# Patient Record
Sex: MALE | Race: BLACK OR AFRICAN AMERICAN | Employment: UNEMPLOYED | URBAN - METROPOLITAN AREA
[De-identification: names, ages, dates, MRNs, and addresses within clinical notes are randomized per-mention and may not be internally consistent; named-entity substitution may affect disease eponyms.]

---

## 2022-10-31 ENCOUNTER — APPOINTMENT (OUTPATIENT)
Dept: GENERAL RADIOLOGY | Age: 62
DRG: 638 | End: 2022-10-31

## 2022-10-31 ENCOUNTER — HOSPITAL ENCOUNTER (INPATIENT)
Age: 62
LOS: 2 days | Discharge: HOME | DRG: 638 | End: 2022-11-02
Attending: EMERGENCY MEDICINE | Admitting: STUDENT IN AN ORGANIZED HEALTH CARE EDUCATION/TRAINING PROGRAM

## 2022-10-31 DIAGNOSIS — E11.10 DIABETIC KETOACIDOSIS WITHOUT COMA ASSOCIATED WITH TYPE 2 DIABETES MELLITUS (HCC): Primary | ICD-10-CM

## 2022-10-31 DIAGNOSIS — E11.9 NEW ONSET TYPE 2 DIABETES MELLITUS (HCC): ICD-10-CM

## 2022-10-31 LAB
A/G RATIO: 1.2 (ref 1.1–2.2)
ALBUMIN SERPL-MCNC: 4.3 G/DL (ref 3.4–5)
ALP BLD-CCNC: 146 U/L (ref 40–129)
ALT SERPL-CCNC: 15 U/L (ref 10–40)
ANION GAP SERPL CALCULATED.3IONS-SCNC: 18 MMOL/L (ref 3–16)
ANION GAP SERPL CALCULATED.3IONS-SCNC: 32 MMOL/L (ref 3–16)
AST SERPL-CCNC: 8 U/L (ref 15–37)
BASE EXCESS VENOUS: -4.6 MMOL/L
BASOPHILS ABSOLUTE: 0 K/UL (ref 0–0.2)
BASOPHILS RELATIVE PERCENT: 0.5 %
BETA-HYDROXYBUTYRATE: >8 MMOL/L (ref 0–0.27)
BILIRUB SERPL-MCNC: 0.5 MG/DL (ref 0–1)
BILIRUBIN URINE: NEGATIVE
BLOOD, URINE: NEGATIVE
BUN BLDV-MCNC: 26 MG/DL (ref 7–20)
BUN BLDV-MCNC: 33 MG/DL (ref 7–20)
CALCIUM SERPL-MCNC: 10.2 MG/DL (ref 8.3–10.6)
CALCIUM SERPL-MCNC: 9.4 MG/DL (ref 8.3–10.6)
CARBOXYHEMOGLOBIN: 1.4 %
CHLORIDE BLD-SCNC: 79 MMOL/L (ref 99–110)
CHLORIDE BLD-SCNC: 95 MMOL/L (ref 99–110)
CLARITY: CLEAR
CO2: 15 MMOL/L (ref 21–32)
CO2: 25 MMOL/L (ref 21–32)
COLOR: YELLOW
CREAT SERPL-MCNC: 1.4 MG/DL (ref 0.8–1.3)
CREAT SERPL-MCNC: 1.7 MG/DL (ref 0.8–1.3)
EKG ATRIAL RATE: 76 BPM
EKG DIAGNOSIS: NORMAL
EKG P AXIS: 54 DEGREES
EKG P-R INTERVAL: 148 MS
EKG Q-T INTERVAL: 388 MS
EKG QRS DURATION: 84 MS
EKG QTC CALCULATION (BAZETT): 436 MS
EKG R AXIS: -1 DEGREES
EKG T AXIS: 17 DEGREES
EKG VENTRICULAR RATE: 76 BPM
EOSINOPHILS ABSOLUTE: 0 K/UL (ref 0–0.6)
EOSINOPHILS RELATIVE PERCENT: 0 %
GFR SERPL CREATININE-BSD FRML MDRD: 44 ML/MIN/{1.73_M2}
GFR SERPL CREATININE-BSD FRML MDRD: 56 ML/MIN/{1.73_M2}
GLUCOSE BLD-MCNC: 201 MG/DL (ref 70–99)
GLUCOSE BLD-MCNC: 224 MG/DL (ref 70–99)
GLUCOSE BLD-MCNC: 224 MG/DL (ref 70–99)
GLUCOSE BLD-MCNC: 254 MG/DL (ref 70–99)
GLUCOSE BLD-MCNC: 256 MG/DL (ref 70–99)
GLUCOSE BLD-MCNC: 328 MG/DL (ref 70–99)
GLUCOSE BLD-MCNC: 381 MG/DL (ref 70–99)
GLUCOSE BLD-MCNC: 395 MG/DL (ref 70–99)
GLUCOSE BLD-MCNC: 406 MG/DL (ref 70–99)
GLUCOSE BLD-MCNC: 465 MG/DL (ref 70–99)
GLUCOSE BLD-MCNC: 787 MG/DL (ref 70–99)
GLUCOSE BLD-MCNC: >600 MG/DL (ref 70–99)
GLUCOSE BLD-MCNC: >600 MG/DL (ref 70–99)
GLUCOSE URINE: 100 MG/DL
HCO3 VENOUS: 24 MMOL/L (ref 23–29)
HCT VFR BLD CALC: 53.7 % (ref 40.5–52.5)
HEMOGLOBIN: 18.1 G/DL (ref 13.5–17.5)
KETONES, URINE: 40 MG/DL
LEUKOCYTE ESTERASE, URINE: NEGATIVE
LYMPHOCYTES ABSOLUTE: 1.4 K/UL (ref 1–5.1)
LYMPHOCYTES RELATIVE PERCENT: 18.6 %
MAGNESIUM: 2.7 MG/DL (ref 1.8–2.4)
MCH RBC QN AUTO: 29.4 PG (ref 26–34)
MCHC RBC AUTO-ENTMCNC: 33.7 G/DL (ref 31–36)
MCV RBC AUTO: 87.2 FL (ref 80–100)
METHEMOGLOBIN VENOUS: 0.6 %
MICROSCOPIC EXAMINATION: ABNORMAL
MONOCYTES ABSOLUTE: 0.6 K/UL (ref 0–1.3)
MONOCYTES RELATIVE PERCENT: 7.2 %
NEUTROPHILS ABSOLUTE: 5.7 K/UL (ref 1.7–7.7)
NEUTROPHILS RELATIVE PERCENT: 73.7 %
NITRITE, URINE: NEGATIVE
O2 SAT, VEN: 29 %
O2 THERAPY: ABNORMAL
PCO2, VEN: 56.5 MMHG (ref 40–50)
PDW BLD-RTO: 14.4 % (ref 12.4–15.4)
PERFORMED ON: ABNORMAL
PH UA: 5 (ref 5–8)
PH VENOUS: 7.24 (ref 7.35–7.45)
PHOSPHORUS: 3.6 MG/DL (ref 2.5–4.9)
PLATELET # BLD: 147 K/UL (ref 135–450)
PLATELET SLIDE REVIEW: ABNORMAL
PMV BLD AUTO: 10.9 FL (ref 5–10.5)
PO2, VEN: <30 MMHG
POTASSIUM SERPL-SCNC: 4.1 MMOL/L (ref 3.5–5.1)
POTASSIUM SERPL-SCNC: 5.1 MMOL/L (ref 3.5–5.1)
PROTEIN UA: NEGATIVE MG/DL
RBC # BLD: 6.16 M/UL (ref 4.2–5.9)
RBC # BLD: NORMAL 10*6/UL
REASON FOR REJECTION: NORMAL
REJECTED TEST: NORMAL
SODIUM BLD-SCNC: 126 MMOL/L (ref 136–145)
SODIUM BLD-SCNC: 138 MMOL/L (ref 136–145)
SPECIFIC GRAVITY UA: 1.03 (ref 1–1.03)
TCO2 CALC VENOUS: 26 MMOL/L
TOTAL PROTEIN: 7.9 G/DL (ref 6.4–8.2)
TROPONIN: <0.01 NG/ML
URINE REFLEX TO CULTURE: ABNORMAL
URINE TYPE: ABNORMAL
UROBILINOGEN, URINE: 0.2 E.U./DL
WBC # BLD: 7.7 K/UL (ref 4–11)

## 2022-10-31 PROCEDURE — 81003 URINALYSIS AUTO W/O SCOPE: CPT

## 2022-10-31 PROCEDURE — 83036 HEMOGLOBIN GLYCOSYLATED A1C: CPT

## 2022-10-31 PROCEDURE — 94760 N-INVAS EAR/PLS OXIMETRY 1: CPT

## 2022-10-31 PROCEDURE — 36415 COLL VENOUS BLD VENIPUNCTURE: CPT

## 2022-10-31 PROCEDURE — 6370000000 HC RX 637 (ALT 250 FOR IP): Performed by: PHYSICIAN ASSISTANT

## 2022-10-31 PROCEDURE — 83735 ASSAY OF MAGNESIUM: CPT

## 2022-10-31 PROCEDURE — 80053 COMPREHEN METABOLIC PANEL: CPT

## 2022-10-31 PROCEDURE — 84484 ASSAY OF TROPONIN QUANT: CPT

## 2022-10-31 PROCEDURE — 6360000002 HC RX W HCPCS: Performed by: STUDENT IN AN ORGANIZED HEALTH CARE EDUCATION/TRAINING PROGRAM

## 2022-10-31 PROCEDURE — 2580000003 HC RX 258: Performed by: STUDENT IN AN ORGANIZED HEALTH CARE EDUCATION/TRAINING PROGRAM

## 2022-10-31 PROCEDURE — 93005 ELECTROCARDIOGRAM TRACING: CPT | Performed by: PHYSICIAN ASSISTANT

## 2022-10-31 PROCEDURE — 71046 X-RAY EXAM CHEST 2 VIEWS: CPT

## 2022-10-31 PROCEDURE — 84100 ASSAY OF PHOSPHORUS: CPT

## 2022-10-31 PROCEDURE — 82803 BLOOD GASES ANY COMBINATION: CPT

## 2022-10-31 PROCEDURE — 85025 COMPLETE CBC W/AUTO DIFF WBC: CPT

## 2022-10-31 PROCEDURE — 2580000003 HC RX 258: Performed by: PHYSICIAN ASSISTANT

## 2022-10-31 PROCEDURE — 82010 KETONE BODYS QUAN: CPT

## 2022-10-31 PROCEDURE — 99285 EMERGENCY DEPT VISIT HI MDM: CPT

## 2022-10-31 PROCEDURE — 2000000000 HC ICU R&B

## 2022-10-31 RX ORDER — DEXTROSE MONOHYDRATE 100 MG/ML
INJECTION, SOLUTION INTRAVENOUS CONTINUOUS PRN
Status: DISCONTINUED | OUTPATIENT
Start: 2022-10-31 | End: 2022-11-02 | Stop reason: HOSPADM

## 2022-10-31 RX ORDER — ACETAMINOPHEN 650 MG/1
650 SUPPOSITORY RECTAL EVERY 6 HOURS PRN
Status: DISCONTINUED | OUTPATIENT
Start: 2022-10-31 | End: 2022-11-02 | Stop reason: HOSPADM

## 2022-10-31 RX ORDER — SODIUM CHLORIDE 0.9 % (FLUSH) 0.9 %
5-40 SYRINGE (ML) INJECTION PRN
Status: DISCONTINUED | OUTPATIENT
Start: 2022-10-31 | End: 2022-11-02 | Stop reason: HOSPADM

## 2022-10-31 RX ORDER — ACETAMINOPHEN 325 MG/1
650 TABLET ORAL EVERY 6 HOURS PRN
Status: DISCONTINUED | OUTPATIENT
Start: 2022-10-31 | End: 2022-11-02 | Stop reason: HOSPADM

## 2022-10-31 RX ORDER — 0.9 % SODIUM CHLORIDE 0.9 %
1000 INTRAVENOUS SOLUTION INTRAVENOUS ONCE
Status: COMPLETED | OUTPATIENT
Start: 2022-10-31 | End: 2022-10-31

## 2022-10-31 RX ORDER — DEXTROSE AND SODIUM CHLORIDE 5; .45 G/100ML; G/100ML
INJECTION, SOLUTION INTRAVENOUS CONTINUOUS PRN
Status: DISCONTINUED | OUTPATIENT
Start: 2022-10-31 | End: 2022-11-01

## 2022-10-31 RX ORDER — MAGNESIUM SULFATE 1 G/100ML
1000 INJECTION INTRAVENOUS PRN
Status: DISCONTINUED | OUTPATIENT
Start: 2022-10-31 | End: 2022-11-02 | Stop reason: HOSPADM

## 2022-10-31 RX ORDER — ONDANSETRON 4 MG/1
4 TABLET, ORALLY DISINTEGRATING ORAL EVERY 8 HOURS PRN
Status: DISCONTINUED | OUTPATIENT
Start: 2022-10-31 | End: 2022-11-02 | Stop reason: HOSPADM

## 2022-10-31 RX ORDER — SODIUM CHLORIDE 0.9 % (FLUSH) 0.9 %
5-40 SYRINGE (ML) INJECTION EVERY 12 HOURS SCHEDULED
Status: DISCONTINUED | OUTPATIENT
Start: 2022-10-31 | End: 2022-11-02 | Stop reason: HOSPADM

## 2022-10-31 RX ORDER — ENOXAPARIN SODIUM 100 MG/ML
40 INJECTION SUBCUTANEOUS DAILY
Status: DISCONTINUED | OUTPATIENT
Start: 2022-11-01 | End: 2022-11-02 | Stop reason: HOSPADM

## 2022-10-31 RX ORDER — SODIUM CHLORIDE 9 MG/ML
INJECTION, SOLUTION INTRAVENOUS CONTINUOUS
Status: DISCONTINUED | OUTPATIENT
Start: 2022-10-31 | End: 2022-11-01

## 2022-10-31 RX ORDER — SODIUM CHLORIDE 9 MG/ML
INJECTION, SOLUTION INTRAVENOUS PRN
Status: DISCONTINUED | OUTPATIENT
Start: 2022-10-31 | End: 2022-11-02 | Stop reason: HOSPADM

## 2022-10-31 RX ORDER — 0.9 % SODIUM CHLORIDE 0.9 %
15 INTRAVENOUS SOLUTION INTRAVENOUS ONCE
Status: DISCONTINUED | OUTPATIENT
Start: 2022-10-31 | End: 2022-11-01

## 2022-10-31 RX ORDER — POLYETHYLENE GLYCOL 3350 17 G/17G
17 POWDER, FOR SOLUTION ORAL DAILY PRN
Status: DISCONTINUED | OUTPATIENT
Start: 2022-10-31 | End: 2022-11-02 | Stop reason: HOSPADM

## 2022-10-31 RX ORDER — ONDANSETRON 2 MG/ML
4 INJECTION INTRAMUSCULAR; INTRAVENOUS EVERY 6 HOURS PRN
Status: DISCONTINUED | OUTPATIENT
Start: 2022-10-31 | End: 2022-11-02 | Stop reason: HOSPADM

## 2022-10-31 RX ORDER — POTASSIUM CHLORIDE 7.45 MG/ML
10 INJECTION INTRAVENOUS PRN
Status: DISCONTINUED | OUTPATIENT
Start: 2022-10-31 | End: 2022-11-01

## 2022-10-31 RX ADMIN — SODIUM CHLORIDE: 9 INJECTION, SOLUTION INTRAVENOUS at 16:45

## 2022-10-31 RX ADMIN — DEXTROSE AND SODIUM CHLORIDE: 5; 450 INJECTION, SOLUTION INTRAVENOUS at 20:34

## 2022-10-31 RX ADMIN — POTASSIUM CHLORIDE 10 MEQ: 7.46 INJECTION, SOLUTION INTRAVENOUS at 18:32

## 2022-10-31 RX ADMIN — POTASSIUM CHLORIDE 10 MEQ: 7.46 INJECTION, SOLUTION INTRAVENOUS at 20:37

## 2022-10-31 RX ADMIN — POTASSIUM CHLORIDE 10 MEQ: 7.46 INJECTION, SOLUTION INTRAVENOUS at 19:34

## 2022-10-31 RX ADMIN — SODIUM CHLORIDE 1000 ML: 9 INJECTION, SOLUTION INTRAVENOUS at 12:27

## 2022-10-31 RX ADMIN — SODIUM CHLORIDE 1000 ML: 9 INJECTION, SOLUTION INTRAVENOUS at 12:37

## 2022-10-31 RX ADMIN — INSULIN HUMAN 9 UNITS/HR: 1 INJECTION, SOLUTION INTRAVENOUS at 14:38

## 2022-10-31 RX ADMIN — SODIUM CHLORIDE, PRESERVATIVE FREE 10 ML: 5 INJECTION INTRAVENOUS at 20:37

## 2022-10-31 ASSESSMENT — PAIN - FUNCTIONAL ASSESSMENT
PAIN_FUNCTIONAL_ASSESSMENT: NONE - DENIES PAIN
PAIN_FUNCTIONAL_ASSESSMENT: 0-10

## 2022-10-31 ASSESSMENT — ENCOUNTER SYMPTOMS
NAUSEA: 1
COLOR CHANGE: 0
SHORTNESS OF BREATH: 0
ABDOMINAL PAIN: 0
VOMITING: 0

## 2022-10-31 ASSESSMENT — PAIN SCALES - GENERAL: PAINLEVEL_OUTOF10: 0

## 2022-10-31 NOTE — H&P
Hospital Medicine History & Physical      PCP: No primary care provider on file. Date of Admission: 10/31/2022    Date of Service: Pt seen/examined on 10/31/22   and Admitted to Inpatient. Chief Complaint: Generalized weakness nausea and vomiting      History Of Present Illness: The patient is a 57 y.o. male who presents to [facility] with generalized weakness nausea and vomiting. Patient with no significant past medical history, not on any medications, stated that for the past 2 to 3 weeks he had been feeling progressively more weak, patient also noted excessive urination and thirst, in the last week patient also reported new onset nausea and vomiting. Patient stated last week he had a short-lived likely viral upper respiratory infection with low-grade fever and cough that had completely resolved. Patient smokes 1 pack for the past 30 years, no significant alcohol intake. Works as a     Past Medical History:        Diagnosis Date    Hypertension        Past Surgical History:    History reviewed. No pertinent surgical history. Medications Prior to Admission:    Prior to Admission medications    Not on File       Allergies:  Patient has no known allergies. Social History:  The patient currently lives independent    TOBACCO:   reports that he has been smoking cigarettes. He has never used smokeless tobacco.  ETOH:   reports that he does not currently use alcohol. Family History:  Reviewed in detail and negative for DM, Early CAD, Cancer, CVA. Positive as follows:    History reviewed. No pertinent family history. REVIEW OF SYSTEMS:   Positive for nausea, vomiting, fatigue, thirst, excessive urination and as noted in the HPI. All other systems reviewed and negative.     PHYSICAL EXAM:    BP (!) 148/96   Pulse 77   Temp 96.9 °F (36.1 °C) (Temporal)   Resp 14   Ht 6' 1\" (1.854 m)   Wt 200 lb (90.7 kg)   SpO2 97%   BMI 26.39 kg/m²     General appearance: No apparent distress appears stated age and cooperative. HEENT Normal cephalic, atraumatic without obvious deformity. Pupils equal, round, and reactive to light. Extra ocular muscles intact. Conjunctivae/corneas clear. Neck: Supple, No jugular venous distention/bruits. Trachea midline without thyromegaly or adenopathy with full range of motion. Lungs: Clear to auscultation, bilaterally without Rales/Wheezes/Rhonchi with good respiratory effort. Heart: Regular rate and rhythm with Normal S1/S2 without murmurs, rubs or gallops, point of maximum impulse non-displaced  Abdomen: Soft, non-tender or non-distended without rigidity or guarding and positive bowel sounds all four quadrants. Extremities: No clubbing, cyanosis, or edema bilaterally. Full range of motion without deformity and normal gait intact. Skin: Skin color, texture, turgor normal.  No rashes or lesions. Neurologic: Alert and oriented X 3, neurovascularly intact with sensory/motor intact upper extremities/lower extremities, bilaterally. Cranial nerves: II-XII intact, grossly non-focal.  Mental status: Alert, oriented, thought content appropriate. Capillary Refill: Acceptable  < 3 seconds  Peripheral Pulses: +3 Easily felt, not easily obliterated with pressure        CBC   Recent Labs     10/31/22  1156   WBC 7.7   HGB 18.1*   HCT 53.7*         RENAL  Recent Labs     10/31/22  1229   *   K 5.1   CL 79*   CO2 15*   BUN 33*   CREATININE 1.7*     LFT'S  Recent Labs     10/31/22  1229   AST 8*   ALT 15   BILITOT 0.5   ALKPHOS 146*     COAG  No results for input(s): INR in the last 72 hours.   CARDIAC ENZYMES  Recent Labs     10/31/22  1229   TROPONINI <0.01       U/A:    Lab Results   Component Value Date/Time    COLORU Yellow 10/31/2022 11:53 AM    CLARITYU Clear 10/31/2022 11:53 AM    SPECGRAV 1.030 10/31/2022 11:53 AM    LEUKOCYTESUR Negative 10/31/2022 11:53 AM    BLOODU Negative 10/31/2022 11:53 AM    GLUCOSEU 100 10/31/2022 11:53 AM       ABG  No results found for: OXU3XIN, BEART, N6NQTVRF, PHART, THGBART, HHS3NOA, PO2ART, YVC8FII        There are no active hospital problems to display for this patient. PHYSICIANS CERTIFICATION:    I certify that Gaby Cummings is expected to be hospitalized for more  than 2 midnights based on the following assessment and plan:      ASSESSMENT/PLAN:    Newly diagnosed diabetes mellitus. DKA. Patient presented to emergency with progressive weakness as well as nausea vomiting, not known to be diabetic. On presentation patient was found to have glucose of 787, with an anion gap of 32, and bicarb of 15. Potassium 5.1    Admitted as a case of DKA, started on insulin as per protocol. Plan  -Continue on insulin infusion  -BMP every 4 hours  -Send hemoglobin A1c  -We will keep on infusion until anion gap closes. .     Acute kidney injury  Creatinine 1.7, likely prerenal.   We will continue to monitor    Hypertension. Noted to be hypertensive on presentation, will continue to monitor trend. Patient will likely need medication on discharge    DVT Prophylaxis: lovenox  Diet: No diet orders on file  Code Status: No Order  PT/OT Eval Status: baseline     Dispo - pending clinical improvement         Angie Pearson MD    Thank you No primary care provider on file. for the opportunity to be involved in this patient's care. If you have any questions or concerns please feel free to contact me at [phone]. Due to the immediate potential for life-threatening deterioration due to DKA  , I spent 31 minutes providing critical care. This time is excluding time spent performing procedures.

## 2022-10-31 NOTE — ED NOTES
Pt comes in after feeling lethargic, tired, and frequent urination - a friend told him this was symptoms of diabetes. He reports not going to the doctor so no medical history to speak of.   A/Ox4, appears lethargic     Veronica Spann, JIAN  10/31/22 8865

## 2022-10-31 NOTE — PROGRESS NOTES
4 Eyes Skin Assessment     NAME:  Guy Blanco  YOB: 1960  MEDICAL RECORD NUMBER:  [id number]    The patient is being assess for  Shift Handoff    I agree that 2 RN's have performed a thorough Head to Toe Skin Assessment on the patient. ALL assessment sites listed below have been assessed. Areas assessed by both nurses:    Head, Face, Ears, Shoulders, Back, Chest, Arms, Elbows, Hands, Sacrum. Buttock, Coccyx, Ischium, and Legs. Feet and Heels        Does the Patient have a Wound?  No noted wound(s)       Saurabh Prevention initiated:  No   Wound Care Orders initiated:  No    Pressure Injury (Stage 3,4, Unstageable, DTI, NWPT, and Complex wounds) if present place referral/consult order under [de-identified] NA    New and Established Ostomies if present place consult order under : NA      Nurse 1 eSignature: Electronically signed by Marjan Pantoja RN on 10/31/22 at 6:00 PM EDT    **SHARE this note so that the co-signing nurse is able to place an eSignature**    Nurse 2 eSignature: Electronically signed by Tierra Neal RN on 10/31/22 at 7:43 PM EDT

## 2022-10-31 NOTE — ED PROVIDER NOTES
[facility]  eMERGENCY dEPARTMENT eNCOUnter   Physician Attestation    Pt Name: Franck Santiago  MRN: [id number]  [place] 1960  Date of evaluation: 10/31/22        Physician Note:    This patient was seen by the advanced practice provider. I have personally performed a face to face diagnostic evaluation on this patient and performed a substantive portion of the visit including all aspects of the medical decision making. History: Briefly, 57 y.o. male presents with just not feeling well for the past several days with generalized weakness and nausea. Patient has not seen a physician in several years. He does smoke. Only has an occasional social drink. No abdominal pain chest pain or shortness of breath. Physical Exam  Constitutional:       Appearance: He is well-developed. He is not diaphoretic. HENT:      Head: Normocephalic and atraumatic. Right Ear: External ear normal.      Left Ear: External ear normal.   Eyes:      General: No scleral icterus. Right eye: No discharge. Left eye: No discharge. Neck:      Thyroid: No thyromegaly. Vascular: No JVD. Trachea: No tracheal deviation. Cardiovascular:      Rate and Rhythm: Normal rate and regular rhythm. Heart sounds: No murmur heard. No friction rub. No gallop. Pulmonary:      Effort: Pulmonary effort is normal. No respiratory distress. Breath sounds: Normal breath sounds. No stridor. No wheezing or rales. Abdominal:      General: There is no distension. Palpations: Abdomen is soft. Tenderness: There is no abdominal tenderness. There is no guarding or rebound. Musculoskeletal:         General: No tenderness. Cervical back: Normal range of motion. Skin:     General: Skin is warm and dry. Findings: No rash (On exposed body surfaces). Neurological:      Mental Status: He is alert and oriented to person, place, and time.       Coordination: Coordination normal.   Psychiatric:         Behavior: Behavior normal.         Thought Content: Thought content normal.       MDM:     EKG #1 done at 11:13 AM visualized preliminarily interpreted by myself. Rhythm is sinus at a rate of 76 with an axis of -1. Some early repolarization changes in the anterior leads. Possible left atrial enlargement. Borderline left ventricle hypertrophy seen in lead aVL. No acute injury pattern appreciated    Second EKG done at 1316 hrs. visualized preliminarily interpreted by myself. This rhythm is sinus is well the rate is 76 the axis is 45. He has more pronounced early repolarization changes seen from V3 across to V6 but I do not see ST elevation event.   Intervals are normal.    Results for orders placed or performed during the hospital encounter of 10/31/22   CBC with Auto Differential   Result Value Ref Range    WBC 7.7 4.0 - 11.0 K/uL    RBC 6.16 (H) 4.20 - 5.90 M/uL    Hemoglobin 18.1 (H) 13.5 - 17.5 g/dL    Hematocrit 53.7 (H) 40.5 - 52.5 %    MCV 87.2 80.0 - 100.0 fL    MCH 29.4 26.0 - 34.0 pg    MCHC 33.7 31.0 - 36.0 g/dL    RDW 14.4 12.4 - 15.4 %    Platelets 904 757 - 457 K/uL    MPV 10.9 (H) 5.0 - 10.5 fL    PLATELET SLIDE REVIEW Decreased     Neutrophils % 73.7 %    Lymphocytes % 18.6 %    Monocytes % 7.2 %    Eosinophils % 0.0 %    Basophils % 0.5 %    Neutrophils Absolute 5.7 1.7 - 7.7 K/uL    Lymphocytes Absolute 1.4 1.0 - 5.1 K/uL    Monocytes Absolute 0.6 0.0 - 1.3 K/uL    Eosinophils Absolute 0.0 0.0 - 0.6 K/uL    Basophils Absolute 0.0 0.0 - 0.2 K/uL    RBC Morphology Normal    Blood Gas, Venous   Result Value Ref Range    pH, Jem 7.238 (L) 7.350 - 7.450    pCO2, Jem 56.5 (H) 40.0 - 50.0 mmHg    pO2, Jem <30 Not Established mmHg    HCO3, Venous 24 23 - 29 mmol/L    Base Excess, Jem -4.6 Not Established mmol/L    O2 Sat, Jem 29 Not Established %    Carboxyhemoglobin 1.4 %    MetHgb, Jem 0.6 <1.5 %    TC02 (Calc), Jem 26 Not Established mmol/L    O2 Therapy Unknown    Urinalysis with Reflex to Culture    Specimen: Urine   Result Value Ref Range    Color, UA Yellow Straw/Yellow    Clarity, UA Clear Clear    Glucose, Ur 100 (A) Negative mg/dL    Bilirubin Urine Negative Negative    Ketones, Urine 40 (A) Negative mg/dL    Specific Gravity, UA 1.030 1.005 - 1.030    Blood, Urine Negative Negative    pH, UA 5.0 5.0 - 8.0    Protein, UA Negative Negative mg/dL    Urobilinogen, Urine 0.2 <2.0 E.U./dL    Nitrite, Urine Negative Negative    Leukocyte Esterase, Urine Negative Negative    Microscopic Examination Not Indicated     Urine Type NotGiven     Urine Reflex to Culture Not Indicated    Beta-Hydroxybutyrate   Result Value Ref Range    Beta-Hydroxybutyrate >8.00 (H) 0.00 - 0.27 mmol/L   Comprehensive Metabolic Panel   Result Value Ref Range    Sodium 126 (L) 136 - 145 mmol/L    Potassium 5.1 3.5 - 5.1 mmol/L    Chloride 79 (L) 99 - 110 mmol/L    CO2 15 (L) 21 - 32 mmol/L    Anion Gap 32 (H) 3 - 16    Glucose 787 (HH) 70 - 99 mg/dL    BUN 33 (H) 7 - 20 mg/dL    Creatinine 1.7 (H) 0.8 - 1.3 mg/dL    Est, Glom Filt Rate 44 (A) >60    Calcium 10.2 8.3 - 10.6 mg/dL    Total Protein 7.9 6.4 - 8.2 g/dL    Albumin 4.3 3.4 - 5.0 g/dL    Albumin/Globulin Ratio 1.2 1.1 - 2.2    Total Bilirubin 0.5 0.0 - 1.0 mg/dL    Alkaline Phosphatase 146 (H) 40 - 129 U/L    ALT 15 10 - 40 U/L    AST 8 (L) 15 - 37 U/L   Troponin   Result Value Ref Range    Troponin <0.01 <0.01 ng/mL   SPECIMEN REJECTION   Result Value Ref Range    Rejected Test CHEMISTRY     Reason for Rejection see below    POCT Glucose   Result Value Ref Range    POC Glucose >600 (AA) 70 - 99 mg/dl    Performed on [facility]    EKG 12 Lead   Result Value Ref Range    Ventricular Rate 76 BPM    Atrial Rate 76 BPM    P-R Interval 148 ms    QRS Duration 84 ms    Q-T Interval 388 ms    QTc Calculation (Bazett) 436 ms    P Axis 54 degrees    R Axis -1 degrees    T Axis 17 degrees    Diagnosis       Normal sinus rhythmMinimal voltage criteria for LVH, may be normal variantBorderline ECGNo previous ECGs availableConfirmed by FESTUS BERGMAN, Inocencio Silverio ([id number]) on 10/31/2022 12:05:51 PM       CRITICAL CARE  I personally saw the patient and independently provided 0 minutes of non-concurrent critical care out of the total shared critical care time provided. This excludes seperately billable procedures. Critical care time was provided for 0 that required close evaluation and/or intervention with concern for potential patient decompensation. 1. Diabetic ketoacidosis without coma associated with type 2 diabetes mellitus ([facility] [address]    2. New onset type 2 diabetes mellitus ([facility] [address]          DISPOSITION/PLAN  PATIENT REFERRED TO:  No follow-up provider specified. DISCHARGE MEDICATIONS:  New Prescriptions    No medications on file         Georges Stallings MD        This report has been produced using speech recognition software and may contain errors related to that system including errors in grammar, punctuation, and spelling, as well as words and phrases that may be inappropriate. If there are any questions or concerns please feel free to contact the dictating provider for clarification.        Georges Stallings MD  10/31/22 7413

## 2022-10-31 NOTE — PROGRESS NOTES
Pt admitted to room 2115. Pt A/O and insulin drip infusing. Report received at the bedside. VSS. Pt updated on plan of care and oriented to room.

## 2022-10-31 NOTE — ED NOTES
ICU ready for patient; bedside report will be given in ICU.   Patient aware of plan of care     Tamiko Logan RN  10/31/22 0042

## 2022-10-31 NOTE — ED PROVIDER NOTES
I did not perform an evaluation of Sarah Michelle but was asked to review his EKG. EKG interpreted by myself.    Rate: normal  Rhythm: NSR  Axis: normal  Intervals: within normal limits  ST Segments: Nonspecific ST/T wave abnormality  Comparison: no prior   Impression: NSR with minimal voltage criteria for LVH versus normal variant, nonspecific ST/T wave abnormality     Ruben Wall MD  10/31/22 9232

## 2022-10-31 NOTE — ED PROVIDER NOTES
[facility]      Pt Name: Briana Amador  MRN: [id number]  [place] 1960  Date of evaluation: 10/31/2022  Provider: TAMERA Tai    This patient was seen and evaluated by the attending physician Fredo Stacy MD.    [address]       Chief Complaint   Patient presents with    Dizziness    Polyuria     Pt c/o dizziness with nausea and increased urination and thirst x 1 week. Denies pain at this time. Denies hx of diabetes. No facial droop or unilateral weakness. CRITICAL CARE TIME   I performed a total Critical Care time of  35 minutes, excluding separately reportable procedures. There was a high probability of clinically significant/life threatening deterioration in the patient's condition which required my urgent intervention. Not limited to multiple reexaminations, discussions with attending physician and consultants. HISTORY OF PRESENT ILLNESS  (Location/Symptom, Timing/Onset, Context/Setting, Quality, Duration, Modifying Factors, Severity.)   Briana Amador is a 57 y.o. male who presents to the emergency department accompanied by his significant other and daughter. He has been having symptoms for 2 to 3 weeks increased thirst, lightheadedness, nausea, increased urination. He denies numbness or weakness or head injury. He admits he has not seen a doctor in many years. His mom is diabetic but he has never personally been told he was diabetic or prediabetic. Nursing Notes were reviewed and I agree. REVIEW OF SYSTEMS    (2-9 systems for level 4, 10 or more for level 5)     Review of Systems   Constitutional:  Negative for fever. Respiratory:  Negative for shortness of breath. Cardiovascular:  Negative for chest pain. Gastrointestinal:  Positive for nausea. Negative for abdominal pain and vomiting. Endocrine: Positive for polydipsia, polyphagia and polyuria.    Genitourinary:  Positive for frequency. Skin:  Negative for color change, rash and wound. Neurological:  Positive for light-headedness. Negative for dizziness and weakness. Psychiatric/Behavioral:  Negative for agitation, behavioral problems and confusion. Except as noted above the remainder of the review of systems was reviewed and negative. PAST MEDICAL HISTORY         Diagnosis Date    Hypertension        SURGICAL HISTORY     History reviewed. No pertinent surgical history. CURRENT MEDICATIONS       Previous Medications    No medications on file       ALLERGIES     Patient has no known allergies. FAMILY HISTORY     History reviewed. No pertinent family history. No family status information on file. SOCIAL HISTORY      reports that he has been smoking cigarettes. He has never used smokeless tobacco. He reports that he does not currently use alcohol. He reports that he does not use drugs. PHYSICAL EXAM    (up to 7 for level 4, 8 or more for level 5)     ED Triage Vitals   BP Temp Temp Source Heart Rate Resp SpO2 Height Weight   10/31/22 1007 10/31/22 1007 10/31/22 1007 10/31/22 1007 10/31/22 1007 10/31/22 1007 10/31/22 1239 10/31/22 1007   (!) 166/94 96.9 °F (36.1 °C) Temporal 79 18 97 % 6' 1\" (1.854 m) 189 lb 9.5 oz (86 kg)       Physical Exam  Vitals and nursing note reviewed. Constitutional:       Appearance: Normal appearance. HENT:      Head: Normocephalic and atraumatic. Eyes:      Pupils: Pupils are equal, round, and reactive to light. Cardiovascular:      Rate and Rhythm: Normal rate. Pulses: Normal pulses. Pulmonary:      Effort: Pulmonary effort is normal. No respiratory distress. Abdominal:      Tenderness: There is no abdominal tenderness. There is no guarding. Musculoskeletal:         General: Normal range of motion. Cervical back: Normal range of motion. Skin:     General: Skin is warm. Neurological:      General: No focal deficit present.       Mental Status: He is alert and oriented to person, place, and time. Psychiatric:         Mood and Affect: Mood normal.         Behavior: Behavior normal.       DIAGNOSTIC RESULTS     EKG: All EKG's are interpreted by TAMERA Bray in the absence of a cardiologist.    EKG interpreted by myself - please refer to attending physician's note for complete EKG interpretation:    Rhythm: sinus rhythm   Early repolarization    RADIOLOGY:   Non-plain film images such as CT, Ultrasound and MRI are read by the radiologist. Plain radiographic images are visualized and preliminarily interpreted by TAMERA Bray with the below findings:    Reviewed radiologist's interpretation.      Interpretation per the Radiologist below, if available at the time of this note:    XR CHEST (2 VW)    (Results Pending)         LABS:  Labs Reviewed   CBC WITH AUTO DIFFERENTIAL - Abnormal; Notable for the following components:       Result Value    RBC 6.16 (*)     Hemoglobin 18.1 (*)     Hematocrit 53.7 (*)     MPV 10.9 (*)     All other components within normal limits   BLOOD GAS, VENOUS - Abnormal; Notable for the following components:    pH, Jem 7.238 (*)     pCO2, Jem 56.5 (*)     All other components within normal limits   URINALYSIS WITH REFLEX TO CULTURE - Abnormal; Notable for the following components:    Glucose, Ur 100 (*)     Ketones, Urine 40 (*)     All other components within normal limits   BETA-HYDROXYBUTYRATE - Abnormal; Notable for the following components:    Beta-Hydroxybutyrate >8.00 (*)     All other components within normal limits    Narrative:     Redraw, previous PST hemolyzed and Lav possible clotting  CALL  Bernard MCCLENDON tel. [phone],  Chemistry results called to and read back by Iesha Pérez RN, 10/31/2022  13:22, by Evita Cueto   COMPREHENSIVE METABOLIC PANEL - Abnormal; Notable for the following components:    Sodium 126 (*)     Chloride 79 (*)     CO2 15 (*)     Anion Gap 32 (*)     Glucose 787 (*)     BUN 33 (*)     Creatinine 1.7 (*)     Est, Glom Filt Rate 44 (*)     Alkaline Phosphatase 146 (*)     AST 8 (*)     All other components within normal limits    Narrative:     Redraw, previous PST hemolyzed and Lav possible clotting  CALL  Wagner  JUANK tel. [phone],  Chemistry results called to and read back by Zehra Palumbo RN, 10/31/2022  13:22, by Jigar Moss   POCT GLUCOSE - Abnormal   POCT GLUCOSE - Abnormal; Notable for the following components:    POC Glucose >600 (*)     All other components within normal limits   TROPONIN    Narrative:     Redraw, previous PST hemolyzed and Lav possible clotting   SPECIMEN REJECTION   POCT GLUCOSE   POCT GLUCOSE   POCT GLUCOSE   POCT GLUCOSE   POCT GLUCOSE   POCT GLUCOSE   POCT GLUCOSE   POCT GLUCOSE   POCT GLUCOSE   POCT GLUCOSE   POCT GLUCOSE   POCT GLUCOSE       All other labs were within normal range or not returned as of this dictation. EMERGENCY DEPARTMENT COURSE and DIFFERENTIAL DIAGNOSIS/MDM:   Vitals:    Vitals:    10/31/22 1239 10/31/22 1241 10/31/22 1242 10/31/22 1245   BP:   (!) 167/97 (!) 148/96   Pulse:  85 78 77   Resp:  22 17 14   Temp:       TempSrc:       SpO2:  99% 100% 97%   Weight: 200 lb (90.7 kg)      Height: 6' 1\" (1.854 m)        Patient's blood sugar is elevated over 700. Blood pressure initially 627 systolic on recheck is coming down is not febrile or tachycardic or hypoxic. Has been having symptoms for 3 weeks. He is in DKA with a pH of 7.23, anion gap of 32 and CO2 of 15. His potassium was 5.1 so he was started on insulin drip after several liters of fluids. He is agreeable to admission. Will consult hospitalist.    CONSULTS:  IP CONSULT TO HOSPITALIST    PROCEDURES:  Procedures      FINAL IMPRESSION      1. Diabetic ketoacidosis without coma associated with type 2 diabetes mellitus ([facility] [address]    2.  New onset type 2 diabetes mellitus ([facility] [address]          DISPOSITION/PLAN   DISPOSITION Decision To Admit 10/31/2022 01:33:53 PM      PATIENT REFERRED TO:  No follow-up provider specified.     DISCHARGE MEDICATIONS:  New Prescriptions    No medications on file       (Please note that portions of this note were completed with a voice recognition program.  Efforts were made to edit the dictations but occasionally words are mis-transcribed.)    Darien Bautista [place]  10/31/22 2885

## 2022-10-31 NOTE — PROGRESS NOTES
Medication Reconciliation    List of medications patient is currently taking is complete. Source of information: 1. Conversation with patient at bedside                                      2. EPIC records      Allergies  Patient has no known allergies. Notes regarding home medications:   1. Patient denies routine use of any prescription/OTC medications.     Alexandru Celis, [address] [place] [address], PharmD, BCPS  10/31/2022 2:12 PM

## 2022-11-01 LAB
ANION GAP SERPL CALCULATED.3IONS-SCNC: 11 MMOL/L (ref 3–16)
ANION GAP SERPL CALCULATED.3IONS-SCNC: 12 MMOL/L (ref 3–16)
ANION GAP SERPL CALCULATED.3IONS-SCNC: 14 MMOL/L (ref 3–16)
ANION GAP SERPL CALCULATED.3IONS-SCNC: 9 MMOL/L (ref 3–16)
BASOPHILS ABSOLUTE: 0 K/UL (ref 0–0.2)
BASOPHILS RELATIVE PERCENT: 0.5 %
BUN BLDV-MCNC: 15 MG/DL (ref 7–20)
BUN BLDV-MCNC: 17 MG/DL (ref 7–20)
BUN BLDV-MCNC: 17 MG/DL (ref 7–20)
BUN BLDV-MCNC: 18 MG/DL (ref 7–20)
CALCIUM SERPL-MCNC: 8.5 MG/DL (ref 8.3–10.6)
CALCIUM SERPL-MCNC: 8.6 MG/DL (ref 8.3–10.6)
CALCIUM SERPL-MCNC: 9 MG/DL (ref 8.3–10.6)
CALCIUM SERPL-MCNC: 9.2 MG/DL (ref 8.3–10.6)
CHLORIDE BLD-SCNC: 101 MMOL/L (ref 99–110)
CHLORIDE BLD-SCNC: 101 MMOL/L (ref 99–110)
CHLORIDE BLD-SCNC: 103 MMOL/L (ref 99–110)
CHLORIDE BLD-SCNC: 104 MMOL/L (ref 99–110)
CO2: 25 MMOL/L (ref 21–32)
CO2: 29 MMOL/L (ref 21–32)
CREAT SERPL-MCNC: 0.8 MG/DL (ref 0.8–1.3)
CREAT SERPL-MCNC: 1 MG/DL (ref 0.8–1.3)
CREAT SERPL-MCNC: 1 MG/DL (ref 0.8–1.3)
CREAT SERPL-MCNC: 1.2 MG/DL (ref 0.8–1.3)
EKG ATRIAL RATE: 76 BPM
EKG DIAGNOSIS: NORMAL
EKG P AXIS: 78 DEGREES
EKG P-R INTERVAL: 144 MS
EKG Q-T INTERVAL: 394 MS
EKG QRS DURATION: 90 MS
EKG QTC CALCULATION (BAZETT): 443 MS
EKG R AXIS: 45 DEGREES
EKG T AXIS: 39 DEGREES
EKG VENTRICULAR RATE: 76 BPM
EOSINOPHILS ABSOLUTE: 0.1 K/UL (ref 0–0.6)
EOSINOPHILS RELATIVE PERCENT: 1 %
ESTIMATED AVERAGE GLUCOSE: 426.9 MG/DL
ESTIMATED AVERAGE GLUCOSE: 446.9 MG/DL
GFR SERPL CREATININE-BSD FRML MDRD: >60 ML/MIN/{1.73_M2}
GLUCOSE BLD-MCNC: 106 MG/DL (ref 70–99)
GLUCOSE BLD-MCNC: 148 MG/DL (ref 70–99)
GLUCOSE BLD-MCNC: 165 MG/DL (ref 70–99)
GLUCOSE BLD-MCNC: 176 MG/DL (ref 70–99)
GLUCOSE BLD-MCNC: 180 MG/DL (ref 70–99)
GLUCOSE BLD-MCNC: 180 MG/DL (ref 70–99)
GLUCOSE BLD-MCNC: 185 MG/DL (ref 70–99)
GLUCOSE BLD-MCNC: 189 MG/DL (ref 70–99)
GLUCOSE BLD-MCNC: 197 MG/DL (ref 70–99)
GLUCOSE BLD-MCNC: 216 MG/DL (ref 70–99)
GLUCOSE BLD-MCNC: 268 MG/DL (ref 70–99)
GLUCOSE BLD-MCNC: 312 MG/DL (ref 70–99)
GLUCOSE BLD-MCNC: 324 MG/DL (ref 70–99)
GLUCOSE BLD-MCNC: 69 MG/DL (ref 70–99)
GLUCOSE BLD-MCNC: 72 MG/DL (ref 70–99)
GLUCOSE BLD-MCNC: 79 MG/DL (ref 70–99)
GLUCOSE BLD-MCNC: 93 MG/DL (ref 70–99)
HBA1C MFR BLD: 16.5 %
HBA1C MFR BLD: 17.2 %
HCT VFR BLD CALC: 41.8 % (ref 40.5–52.5)
HEMOGLOBIN: 14.2 G/DL (ref 13.5–17.5)
LYMPHOCYTES ABSOLUTE: 3.1 K/UL (ref 1–5.1)
LYMPHOCYTES RELATIVE PERCENT: 36.3 %
MAGNESIUM: 2.1 MG/DL (ref 1.8–2.4)
MAGNESIUM: 2.3 MG/DL (ref 1.8–2.4)
MAGNESIUM: 2.4 MG/DL (ref 1.8–2.4)
MAGNESIUM: 2.4 MG/DL (ref 1.8–2.4)
MCH RBC QN AUTO: 29.3 PG (ref 26–34)
MCHC RBC AUTO-ENTMCNC: 33.8 G/DL (ref 31–36)
MCV RBC AUTO: 86.5 FL (ref 80–100)
MONOCYTES ABSOLUTE: 0.6 K/UL (ref 0–1.3)
MONOCYTES RELATIVE PERCENT: 6.7 %
NEUTROPHILS ABSOLUTE: 4.8 K/UL (ref 1.7–7.7)
NEUTROPHILS RELATIVE PERCENT: 55.5 %
PDW BLD-RTO: 14 % (ref 12.4–15.4)
PERFORMED ON: ABNORMAL
PERFORMED ON: NORMAL
PERFORMED ON: NORMAL
PHOSPHORUS: 2.3 MG/DL (ref 2.5–4.9)
PHOSPHORUS: 3.1 MG/DL (ref 2.5–4.9)
PHOSPHORUS: 3.1 MG/DL (ref 2.5–4.9)
PHOSPHORUS: 3.3 MG/DL (ref 2.5–4.9)
PLATELET # BLD: 157 K/UL (ref 135–450)
PMV BLD AUTO: 8.8 FL (ref 5–10.5)
POTASSIUM SERPL-SCNC: 3.9 MMOL/L (ref 3.5–5.1)
POTASSIUM SERPL-SCNC: 4.2 MMOL/L (ref 3.5–5.1)
POTASSIUM SERPL-SCNC: 4.2 MMOL/L (ref 3.5–5.1)
POTASSIUM SERPL-SCNC: 4.5 MMOL/L (ref 3.5–5.1)
RBC # BLD: 4.84 M/UL (ref 4.2–5.9)
SODIUM BLD-SCNC: 135 MMOL/L (ref 136–145)
SODIUM BLD-SCNC: 139 MMOL/L (ref 136–145)
SODIUM BLD-SCNC: 142 MMOL/L (ref 136–145)
SODIUM BLD-SCNC: 143 MMOL/L (ref 136–145)
WBC # BLD: 8.7 K/UL (ref 4–11)

## 2022-11-01 PROCEDURE — 2580000003 HC RX 258: Performed by: STUDENT IN AN ORGANIZED HEALTH CARE EDUCATION/TRAINING PROGRAM

## 2022-11-01 PROCEDURE — 6360000002 HC RX W HCPCS: Performed by: STUDENT IN AN ORGANIZED HEALTH CARE EDUCATION/TRAINING PROGRAM

## 2022-11-01 PROCEDURE — 85025 COMPLETE CBC W/AUTO DIFF WBC: CPT

## 2022-11-01 PROCEDURE — 2500000003 HC RX 250 WO HCPCS: Performed by: STUDENT IN AN ORGANIZED HEALTH CARE EDUCATION/TRAINING PROGRAM

## 2022-11-01 PROCEDURE — 6370000000 HC RX 637 (ALT 250 FOR IP): Performed by: PHYSICIAN ASSISTANT

## 2022-11-01 PROCEDURE — 83735 ASSAY OF MAGNESIUM: CPT

## 2022-11-01 PROCEDURE — 83036 HEMOGLOBIN GLYCOSYLATED A1C: CPT

## 2022-11-01 PROCEDURE — 36415 COLL VENOUS BLD VENIPUNCTURE: CPT

## 2022-11-01 PROCEDURE — 94760 N-INVAS EAR/PLS OXIMETRY 1: CPT

## 2022-11-01 PROCEDURE — 80048 BASIC METABOLIC PNL TOTAL CA: CPT

## 2022-11-01 PROCEDURE — 6370000000 HC RX 637 (ALT 250 FOR IP): Performed by: STUDENT IN AN ORGANIZED HEALTH CARE EDUCATION/TRAINING PROGRAM

## 2022-11-01 PROCEDURE — 1200000000 HC SEMI PRIVATE

## 2022-11-01 PROCEDURE — 84100 ASSAY OF PHOSPHORUS: CPT

## 2022-11-01 RX ORDER — INSULIN GLARGINE 100 [IU]/ML
5 INJECTION, SOLUTION SUBCUTANEOUS EVERY MORNING
Status: DISCONTINUED | OUTPATIENT
Start: 2022-11-01 | End: 2022-11-02

## 2022-11-01 RX ORDER — INSULIN LISPRO 100 [IU]/ML
0-4 INJECTION, SOLUTION INTRAVENOUS; SUBCUTANEOUS
Status: DISCONTINUED | OUTPATIENT
Start: 2022-11-01 | End: 2022-11-02 | Stop reason: HOSPADM

## 2022-11-01 RX ORDER — INSULIN LISPRO 100 [IU]/ML
0-4 INJECTION, SOLUTION INTRAVENOUS; SUBCUTANEOUS NIGHTLY
Status: DISCONTINUED | OUTPATIENT
Start: 2022-11-01 | End: 2022-11-02 | Stop reason: HOSPADM

## 2022-11-01 RX ADMIN — POTASSIUM CHLORIDE 10 MEQ: 7.46 INJECTION, SOLUTION INTRAVENOUS at 01:03

## 2022-11-01 RX ADMIN — INSULIN HUMAN 0.04 UNITS/KG/HR: 1 INJECTION, SOLUTION INTRAVENOUS at 05:44

## 2022-11-01 RX ADMIN — POTASSIUM CHLORIDE 10 MEQ: 7.46 INJECTION, SOLUTION INTRAVENOUS at 05:56

## 2022-11-01 RX ADMIN — POTASSIUM CHLORIDE 10 MEQ: 7.46 INJECTION, SOLUTION INTRAVENOUS at 09:50

## 2022-11-01 RX ADMIN — SODIUM CHLORIDE, PRESERVATIVE FREE 10 ML: 5 INJECTION INTRAVENOUS at 21:58

## 2022-11-01 RX ADMIN — POTASSIUM CHLORIDE 10 MEQ: 7.46 INJECTION, SOLUTION INTRAVENOUS at 04:55

## 2022-11-01 RX ADMIN — INSULIN LISPRO 4 UNITS: 100 INJECTION, SOLUTION INTRAVENOUS; SUBCUTANEOUS at 21:53

## 2022-11-01 RX ADMIN — SODIUM PHOSPHATE, MONOBASIC, MONOHYDRATE 10 MMOL: 276; 142 INJECTION, SOLUTION INTRAVENOUS at 11:07

## 2022-11-01 RX ADMIN — POTASSIUM CHLORIDE 10 MEQ: 7.46 INJECTION, SOLUTION INTRAVENOUS at 02:30

## 2022-11-01 RX ADMIN — ENOXAPARIN SODIUM 40 MG: 100 INJECTION SUBCUTANEOUS at 08:48

## 2022-11-01 RX ADMIN — INSULIN GLARGINE 5 UNITS: 100 INJECTION, SOLUTION SUBCUTANEOUS at 08:57

## 2022-11-01 RX ADMIN — SODIUM CHLORIDE, PRESERVATIVE FREE 10 ML: 5 INJECTION INTRAVENOUS at 08:46

## 2022-11-01 RX ADMIN — DEXTROSE AND SODIUM CHLORIDE: 5; 450 INJECTION, SOLUTION INTRAVENOUS at 03:03

## 2022-11-01 RX ADMIN — POTASSIUM CHLORIDE 10 MEQ: 7.46 INJECTION, SOLUTION INTRAVENOUS at 03:54

## 2022-11-01 RX ADMIN — POTASSIUM CHLORIDE 10 MEQ: 7.46 INJECTION, SOLUTION INTRAVENOUS at 08:47

## 2022-11-01 RX ADMIN — DEXTROSE AND SODIUM CHLORIDE: 5; 450 INJECTION, SOLUTION INTRAVENOUS at 09:48

## 2022-11-01 RX ADMIN — INSULIN LISPRO 2 UNITS: 100 INJECTION, SOLUTION INTRAVENOUS; SUBCUTANEOUS at 17:24

## 2022-11-01 ASSESSMENT — PAIN SCALES - GENERAL
PAINLEVEL_OUTOF10: 0

## 2022-11-01 NOTE — PROGRESS NOTES
Repeat blood sugar after an hour pause of insulin gtt is 106; Dr. Adina Monterroso made aware and okay to keep insulin paused for another hour per MD.

## 2022-11-01 NOTE — PROGRESS NOTES
Hospitalist Progress Note      PCP: No primary care provider on file. Date of Admission: 10/31/2022    Chief Complaint:   Generalized weakness nausea and vomiting    Hospital Course:    57 y.o. male who presents to [facility] with generalized weakness nausea and vomiting. Patient with no significant past medical history, not on any medications, stated that for the past 2 to 3 weeks he had been feeling progressively more weak, patient also noted excessive urination and thirst, in the last week patient also reported new onset nausea and vomiting. Patient presented with DKA. Subjective:   Continues to report nausea, was able to tolerate his meal.        Medications:  Reviewed    Infusion Medications    dextrose      sodium chloride       Scheduled Medications    insulin glargine  5 Units SubCUTAneous QAM    insulin lispro  0-4 Units SubCUTAneous TID WC    insulin lispro  0-4 Units SubCUTAneous Nightly    sodium chloride flush  5-40 mL IntraVENous 2 times per day    enoxaparin  40 mg SubCUTAneous Daily     PRN Meds: glucose, dextrose bolus **OR** dextrose bolus, glucagon (rDNA), dextrose, sodium chloride flush, sodium chloride, ondansetron **OR** ondansetron, polyethylene glycol, acetaminophen **OR** acetaminophen, magnesium sulfate, sodium phosphate IVPB **OR** sodium phosphate IVPB **OR** sodium phosphate IVPB      Intake/Output Summary (Last 24 hours) at 11/1/2022 1304  Last data filed at 11/1/2022 0445  Gross per 24 hour   Intake 3607.85 ml   Output 1000 ml   Net 2607.85 ml       Physical Exam Performed:    BP (!) 144/84   Pulse 66   Temp 98 °F (36.7 °C) (Oral)   Resp 12   Ht 6' 1\" (1.854 m)   Wt 197 lb 5 oz (89.5 kg)   SpO2 97%   BMI 26.03 kg/m²     General appearance: No apparent distress, appears stated age and cooperative. HEENT: Pupils equal, round, and reactive to light. Conjunctivae/corneas clear. Neck: Supple, with full range of motion. No jugular venous distention. Trachea midline. Respiratory:  Normal respiratory effort. Clear to auscultation, bilaterally without Rales/Wheezes/Rhonchi. Cardiovascular: Regular rate and rhythm with normal S1/S2 without murmurs, rubs or gallops. Abdomen: Soft, non-tender, non-distended with normal bowel sounds. Musculoskeletal: No clubbing, cyanosis or edema bilaterally. Full range of motion without deformity. Skin: Skin color, texture, turgor normal.  No rashes or lesions. Neurologic:  Neurovascularly intact without any focal sensory/motor deficits. Cranial nerves: II-XII intact, grossly non-focal.  Psychiatric: Alert and oriented, thought content appropriate, normal insight  Capillary Refill: Brisk, 3 seconds, normal   Peripheral Pulses: +2 palpable, equal bilaterally       Labs:   Recent Labs     10/31/22  1156 11/01/22  0622   WBC 7.7 8.7   HGB 18.1* 14.2   HCT 53.7* 41.8    157     Recent Labs     11/01/22  0208 11/01/22  0622 11/01/22  0844    139 135*   K 4.2 4.2 3.9    103 101   CO2 25 25 25   BUN 17 15 17   CREATININE 1.0 1.0 0.8   CALCIUM 9.0 8.5 8.6   PHOS 3.3 3.1 2.3*     Recent Labs     10/31/22  1229   AST 8*   ALT 15   BILITOT 0.5   ALKPHOS 146*     No results for input(s): INR in the last 72 hours. Recent Labs     10/31/22  1229   TROPONINI <0.01       Urinalysis:      Lab Results   Component Value Date/Time    NITRU Negative 10/31/2022 11:53 AM    BLOODU Negative 10/31/2022 11:53 AM    SPECGRAV 1.030 10/31/2022 11:53 AM    GLUCOSEU 100 10/31/2022 11:53 AM       Radiology:  XR CHEST (2 VW)   Final Result   No acute cardiopulmonary disease. Assessment/Plan:    Active Hospital Problems    Diagnosis     DKA, type 2, not at goal [facility]) [E11.10]      Priority: Medium     Newly diagnosed diabetes mellitus. DKA. Patient presented to emergency with progressive weakness as well as nausea vomiting, not known to be diabetic.    On presentation patient was found to have glucose of 787, with an anion gap of 32, and bicarb of 15. Potassium 5.1     Admitted as a case of DKA, started on insulin as per protocol. Gap closed, glucose normalized patient was transitioned to subcut insulin  Hemoglobin A1c 16  Plan  -Will need insulin on discharge, will start on glargine 5 for now. -Will observe for 24 hours to calculate the proper insulin dose  -Keep on sliding scale   -We will continue to follow     Acute kidney injury  Creatinine 1.7, likely prerenal.   Later normalized       Hypertension. Noted to be elevated on admission however is improving. Plan-we will continue to observe, may need medication on discharge. DVT Prophylaxis: lovenox  Diet: ADULT DIET; Regular; 4 carb choices (60 gm/meal)  Code Status: Full Code  PT/OT Eval Status: baseline     Dispo - possible DC tomorrow.        Vinay Scott MD

## 2022-11-01 NOTE — CARE COORDINATION
Discharge Planning:    Attempted to speak with patient at bedside re: discharge planning. Patient soundly asleep, RR WDL. Will re-attempt later as time allows.     Electronically signed by Valorie Turcios RN on 11/1/2022 at 3:29 PM

## 2022-11-01 NOTE — PROGRESS NOTES
[facility]  Diabetes Education   Progress Note       NAME:  Guy Blanco  MEDICAL RECORD NUMBER:  [id number]  AGE: 57 y.o. GENDER: male  : 1960  TODAY'S DATE:  2022    Subjective   Reason for Diabetic Education Evaluation and Assessment:new onset diabetes, DKA    Rafael Mcgregor is not surprised by the diagnosis of diabetes. He reports a family history. He experiences increased thirst, frequent urination and a 20 lb unplanned weight loss. He reports feeling better today and is eager to try to eat. Visit Type: evaluation      Guy Blanco is a 57 y.o. male referred by:     [x] Physician  [] Nursing  [] Chart Review   [] Other:     PAST MEDICAL HISTORY        Diagnosis Date    Hypertension        PAST SURGICAL HISTORY    History reviewed. No pertinent surgical history. FAMILY HISTORY    History reviewed. No pertinent family history.     SOCIAL HISTORY    Social History     Tobacco Use    Smoking status: Every Day     Types: Cigarettes    Smokeless tobacco: Never   Substance Use Topics    Alcohol use: Not Currently    Drug use: Never       ALLERGIES    No Known Allergies    MEDICATIONS     insulin glargine  5 Units SubCUTAneous QAM    insulin lispro  0-4 Units SubCUTAneous TID WC    insulin lispro  0-4 Units SubCUTAneous Nightly    sodium chloride flush  5-40 mL IntraVENous 2 times per day    enoxaparin  40 mg SubCUTAneous Daily    sodium chloride  15 mL/kg IntraVENous Once       Objective        Patient Active Problem List   Diagnosis Code    DKA, type 2, not at goal ([facility] [address] E11.10        BP (!) 144/84   Pulse 60   Temp 98 °F (36.7 °C) (Oral)   Resp 12   Ht 6' 1\" (1.854 m)   Wt 197 lb 5 oz (89.5 kg)   SpO2 97%   BMI 26.03 kg/m²     HgBA1c:  No results found for: LABA1C    Recent Labs     22  0443 22  0539 22  0634 22  0838   POCGLU 165* 185* 180* 176*       BUN/Creatinine:    Lab Results   Component Value Date/Time    BUN 17 2022 08:44 AM CREATININE 0.8 11/01/2022 08:44 AM       Assessment        Diabetes Management and Education    Does the patient have a Primary Care Physician? No     Does the patient require new medication instruction? Yes - prior experience with insulin administration to his mother. Reviewed pen administration steps. Person responsible for administration of Insulin/Medication:        [x] Self     [] Caregiver       [] Spouse       [] Other Family Member   []  Other    Insulin Instruction:  insulin pen  Injection Site:   [x] location    [x] rotation     Level of patient/caregiver understanding able to:       [x] Verbalized Understanding   [] Demonstrated Understanding       [x] Teach Back       [] Needs Reinforcement     []  Other:        Does the patient/caregiver monitor Blood Glucoses? No:   Reviewed glycemic control targets, testing frequency and when to call PCP. Level of patient/caregiver understanding able to:        [x] Verbalized Understanding   [] Demonstrated Understanding       [] Teach Back       [] Needs Reinforcement     []  Other:        Does the patient/caregiver follow a Meal Plan? No: Skips breakfast.    Recommend making water, unsweetened tea or coffee primary drinks. Identified common carbs and portion sizes. Reviewed importance of eating three meals per day and plate method for consistent carb intake. Level of patient/caregiver understanding able to:       [] Verbalized Understanding   [] Demonstrated Understanding       [] Teach Back       [x] Needs Reinforcement     []  Other:      Does the patient/caregiver understand S/S of Hypoglycemia? No:                   Does the patient/caregiver understand S/S of Hyperglycemia? Yes    Reviewed symptoms, prevention and treatment.     Level of patient/caregiver understanding able to:        [x] Verbalized Understanding   [] Demonstrated Understanding       [] Teach Back       [] Needs Reinforcement     []  Other:           Plan        Ongoing diabetes education and blood glucose monitoring. Social Service Consult Made:  Yes    Recommend meds to beds. Referral to [employer] faxed. Fact sheet provided. Recommend referral to [employer].         The following educational and support materials were provided:  My contact information  The Diabetes Education Program:  Planning Healthy Meals   [employer] handout - Carbohydrate Counting for People with Diabetes  Blood Glucose Log Book                                           Discharge Plan:  Discharge needs: insulin pens and 4mm pen needles and glucometer/strips/lancets       Teaching Time Diabetes Education:  40 minutes     Electronically signed by Natividad Whitmore on 11/1/2022 at 10:43 AM

## 2022-11-01 NOTE — PROGRESS NOTES
Pt's BS 69 and repeat 79. Dr. Genesis Soliz in the unit and updated. Okay to hold insulin for one hour and let D51/2 NS run at the same rate. Per MD If recheck blood sugar is less than 150, will continue to pause insulin until greater than 150.

## 2022-11-01 NOTE — PROGRESS NOTES
Blood sugar is 189 , recent BMP resulted . Dr. May Buerger perfecteserved and updated with the above results and if okay to transition to pt. MD requested to restart insulin gtt and one more BMP .

## 2022-11-01 NOTE — PLAN OF CARE
Problem: Discharge Planning  Goal: Discharge to home or other facility with appropriate resources  Outcome: Progressing  Flowsheets  Taken 10/31/2022 2000 by Everardo Belle RN  Discharge to home or other facility with appropriate resources: Identify barriers to discharge with patient and caregiver    Problem: Safety - Adult  Goal: Free from fall injury  Outcome: Progressing  Note: Falling star program remains in place. Call light and personal belongings within reach. Frequent visual monitoring continues. Toileting program inplace. Patient assisted in turning/repositioning at least once every 2 hours, and on a prn basis.

## 2022-11-01 NOTE — PLAN OF CARE
Problem: Discharge Planning  Goal: Discharge to home or other facility with appropriate resources  11/1/2022 1000 by Chante Ponce RN  Outcome: Progressing  11/1/2022 0424 by Marie Keller RN  Outcome: Progressing  Flowsheets  Taken 10/31/2022 2000 by Marie Keller RN  Discharge to home or other facility with appropriate resources: Identify barriers to discharge with patient and caregiver  Taken 10/31/2022 1628 by Maikel Georges RN  Discharge to home or other facility with appropriate resources:   Identify barriers to discharge with patient and caregiver   Arrange for needed discharge resources and transportation as appropriate     Problem: Safety - Adult  Goal: Free from fall injury  11/1/2022 1000 by Chante Ponce RN  Outcome: Progressing  11/1/2022 0424 by Marie Keller RN  Outcome: Progressing  Note: Falling star program remains in place. Call light and personal belongings within reach. Frequent visual monitoring continues. Toileting program inplace. Patient assisted in turning/repositioning at least once every 2 hours, and on a prn basis.

## 2022-11-01 NOTE — Clinical Note
INITIAL CASE MANAGEMENT ASSESSMENT    Reviewed chart, met with patient to assess possible discharge needs. Explained Case Management role/services. Living Situation: ***    ADLs: ***     DME: ***    PT/OT Recs: ***     Active Services: ***     Transportation: ***     Medications: ***    PCP: ***      HD/PD: ***    PLAN/COMMENTS: ***      The Plan for Transition of Care is related to the following treatment goals: ***    The Patient and/or patient representative *** was provided with a choice of provider and agrees   with the discharge plan. [] Yes [] No    Freedom of choice list was provided with basic dialogue that supports the patient's individualized plan of care/goals, treatment preferences and shares the quality data associated with the providers. [] Yes [] No    SW/CM provided contact information for patient or family to call with any questions. SW/CM will follow and assist as needed.

## 2022-11-02 VITALS
SYSTOLIC BLOOD PRESSURE: 134 MMHG | WEIGHT: 197.53 LBS | DIASTOLIC BLOOD PRESSURE: 84 MMHG | BODY MASS INDEX: 26.18 KG/M2 | RESPIRATION RATE: 14 BRPM | TEMPERATURE: 97.5 F | HEART RATE: 70 BPM | HEIGHT: 73 IN | OXYGEN SATURATION: 98 %

## 2022-11-02 LAB
ANION GAP SERPL CALCULATED.3IONS-SCNC: 14 MMOL/L (ref 3–16)
BASOPHILS ABSOLUTE: 0 K/UL (ref 0–0.2)
BASOPHILS RELATIVE PERCENT: 0.5 %
BUN BLDV-MCNC: 11 MG/DL (ref 7–20)
CALCIUM SERPL-MCNC: 8.4 MG/DL (ref 8.3–10.6)
CHLORIDE BLD-SCNC: 101 MMOL/L (ref 99–110)
CO2: 21 MMOL/L (ref 21–32)
CREAT SERPL-MCNC: 0.9 MG/DL (ref 0.8–1.3)
EOSINOPHILS ABSOLUTE: 0.1 K/UL (ref 0–0.6)
EOSINOPHILS RELATIVE PERCENT: 1.1 %
GFR SERPL CREATININE-BSD FRML MDRD: >60 ML/MIN/{1.73_M2}
GLUCOSE BLD-MCNC: 251 MG/DL (ref 70–99)
GLUCOSE BLD-MCNC: 295 MG/DL (ref 70–99)
GLUCOSE BLD-MCNC: 310 MG/DL (ref 70–99)
GLUCOSE BLD-MCNC: 394 MG/DL (ref 70–99)
HCT VFR BLD CALC: 41.9 % (ref 40.5–52.5)
HEMOGLOBIN: 14 G/DL (ref 13.5–17.5)
LYMPHOCYTES ABSOLUTE: 3 K/UL (ref 1–5.1)
LYMPHOCYTES RELATIVE PERCENT: 42.4 %
MAGNESIUM: 2.1 MG/DL (ref 1.8–2.4)
MCH RBC QN AUTO: 29.3 PG (ref 26–34)
MCHC RBC AUTO-ENTMCNC: 33.5 G/DL (ref 31–36)
MCV RBC AUTO: 87.3 FL (ref 80–100)
MONOCYTES ABSOLUTE: 0.5 K/UL (ref 0–1.3)
MONOCYTES RELATIVE PERCENT: 6.5 %
NEUTROPHILS ABSOLUTE: 3.5 K/UL (ref 1.7–7.7)
NEUTROPHILS RELATIVE PERCENT: 49.5 %
PDW BLD-RTO: 13.9 % (ref 12.4–15.4)
PERFORMED ON: ABNORMAL
PHOSPHORUS: 2.8 MG/DL (ref 2.5–4.9)
PLATELET # BLD: 131 K/UL (ref 135–450)
PMV BLD AUTO: 8.8 FL (ref 5–10.5)
POTASSIUM SERPL-SCNC: 4.1 MMOL/L (ref 3.5–5.1)
RBC # BLD: 4.8 M/UL (ref 4.2–5.9)
SODIUM BLD-SCNC: 136 MMOL/L (ref 136–145)
WBC # BLD: 7.1 K/UL (ref 4–11)

## 2022-11-02 PROCEDURE — 85025 COMPLETE CBC W/AUTO DIFF WBC: CPT

## 2022-11-02 PROCEDURE — 94760 N-INVAS EAR/PLS OXIMETRY 1: CPT

## 2022-11-02 PROCEDURE — 80048 BASIC METABOLIC PNL TOTAL CA: CPT

## 2022-11-02 PROCEDURE — 36415 COLL VENOUS BLD VENIPUNCTURE: CPT

## 2022-11-02 PROCEDURE — 6370000000 HC RX 637 (ALT 250 FOR IP): Performed by: STUDENT IN AN ORGANIZED HEALTH CARE EDUCATION/TRAINING PROGRAM

## 2022-11-02 PROCEDURE — 6360000002 HC RX W HCPCS: Performed by: STUDENT IN AN ORGANIZED HEALTH CARE EDUCATION/TRAINING PROGRAM

## 2022-11-02 PROCEDURE — 83735 ASSAY OF MAGNESIUM: CPT

## 2022-11-02 PROCEDURE — 2580000003 HC RX 258: Performed by: STUDENT IN AN ORGANIZED HEALTH CARE EDUCATION/TRAINING PROGRAM

## 2022-11-02 PROCEDURE — 84100 ASSAY OF PHOSPHORUS: CPT

## 2022-11-02 RX ORDER — BLOOD-GLUCOSE METER
1 EACH MISCELLANEOUS
Qty: 1 KIT | Refills: 1 | Status: SHIPPED | OUTPATIENT
Start: 2022-11-02

## 2022-11-02 RX ORDER — INSULIN GLARGINE 100 [IU]/ML
7 INJECTION, SOLUTION SUBCUTANEOUS EVERY MORNING
Status: DISCONTINUED | OUTPATIENT
Start: 2022-11-02 | End: 2022-11-02

## 2022-11-02 RX ORDER — LANCETS
1 EACH MISCELLANEOUS
Qty: 1 EACH | Refills: 3 | Status: SHIPPED | OUTPATIENT
Start: 2022-11-02 | End: 2023-01-31

## 2022-11-02 RX ORDER — INSULIN LISPRO 100 [IU]/ML
12 INJECTION, SOLUTION INTRAVENOUS; SUBCUTANEOUS ONCE
Status: COMPLETED | OUTPATIENT
Start: 2022-11-02 | End: 2022-11-02

## 2022-11-02 RX ORDER — INSULIN LISPRO 100 [IU]/ML
4 INJECTION, SOLUTION INTRAVENOUS; SUBCUTANEOUS ONCE
Status: COMPLETED | OUTPATIENT
Start: 2022-11-02 | End: 2022-11-02

## 2022-11-02 RX ORDER — GLUCOSAMINE HCL/CHONDROITIN SU 500-400 MG
CAPSULE ORAL
Qty: 50 STRIP | Refills: 0 | Status: SHIPPED | OUTPATIENT
Start: 2022-11-02

## 2022-11-02 RX ORDER — INSULIN LISPRO 100 [IU]/ML
5 INJECTION, SOLUTION INTRAVENOUS; SUBCUTANEOUS
Qty: 4.5 ML | Refills: 2 | Status: SHIPPED | OUTPATIENT
Start: 2022-11-02 | End: 2023-01-31

## 2022-11-02 RX ORDER — INSULIN GLARGINE 100 [IU]/ML
5 INJECTION, SOLUTION SUBCUTANEOUS NIGHTLY
Qty: 5 ADJUSTABLE DOSE PRE-FILLED PEN SYRINGE | Refills: 3 | Status: SHIPPED | OUTPATIENT
Start: 2022-11-02

## 2022-11-02 RX ADMIN — ENOXAPARIN SODIUM 40 MG: 100 INJECTION SUBCUTANEOUS at 09:12

## 2022-11-02 RX ADMIN — SODIUM CHLORIDE, PRESERVATIVE FREE 10 ML: 5 INJECTION INTRAVENOUS at 09:13

## 2022-11-02 RX ADMIN — INSULIN LISPRO 12 UNITS: 100 INJECTION, SOLUTION INTRAVENOUS; SUBCUTANEOUS at 09:14

## 2022-11-02 RX ADMIN — INSULIN LISPRO 2 UNITS: 100 INJECTION, SOLUTION INTRAVENOUS; SUBCUTANEOUS at 09:14

## 2022-11-02 RX ADMIN — INSULIN LISPRO 4 UNITS: 100 INJECTION, SOLUTION INTRAVENOUS; SUBCUTANEOUS at 12:00

## 2022-11-02 RX ADMIN — INSULIN LISPRO 4 UNITS: 100 INJECTION, SOLUTION INTRAVENOUS; SUBCUTANEOUS at 12:45

## 2022-11-02 NOTE — PROGRESS NOTES
Pt arrived to 3104 from 2115, VSS, AOX4, indpendent with needs. Reviewed plan of care, call light system and oriented to room. Pt denies needs at this time, encouraged to call with any concerns, pt verbalized understanding.

## 2022-11-02 NOTE — PROGRESS NOTES
Medication Assistance Voucher  Patient: John Higginbotham  YOB: 1960    Floor/Unit:[id number]  Discharge Date: 11/2/2022   Approver Name/Title:    Cost Center to be cross-charged: [id number]  Medications Approved : CLINICAL PHARMACY NOTE: MEDS TO BEDS    Total # of Prescriptions Filled: 7   The following medications were delivered to the patient:  Current Discharge Medication List        START taking these medications    Details   metFORMIN (GLUCOPHAGE) 500 MG tablet Take 2 tablets by mouth 2 times daily (with meals)  Qty: 60 tablet, Refills: 0      insulin glargine (LANTUS SOLOSTAR) 100 UNIT/ML injection pen Inject 5 Units into the skin nightly  Qty: 5 Adjustable Dose Pre-filled Pen Syringe, Refills: 3      insulin lispro, 1 Unit Dial, (HUMALOG KWIKPEN) 100 UNIT/ML SOPN Inject 5 Units into the skin 3 times daily (before meals)  Qty: 4.5 mL, Refills: 2      Accu-Chek FastClix Lancets MISC 1 each by Does not apply route 3 times daily (before meals) Pharmacy to make formulary changes as needed  Qty: 1 each, Refills: 3      Blood Glucose Monitoring Suppl (ACCU-CHEK GUIDE) w/Device KIT 1 each by Does not apply route 3 times daily (before meals) Pharmacy to make formulary changes as needed  Qty: 1 kit, Refills: 1      blood glucose monitor strips Test 3 times a day & as needed for symptoms of irregular blood glucose. Dispense sufficient amount for indicated testing frequency plus additional to accommodate PRN testing needs Pharmacy to make formulary changes as needed  Qty: 50 strip, Refills: 0      Insulin Pen Needle 32G X 4 MM MISC 1 each by Does not apply route daily  Qty: 100 each, Refills: 3               Additional Documentation:      By signing below, I attest that I have the authority to authorize medication assistance for the patient and medications listed above. I understand that the cost center listed above will be cross-charged for the total cost of the drugs dispensed.   Pharmacy Instructions   Bill authorized prescriptions listed above to third party plan [facility]  o ID: Approvers last name  o Group: Cost center of the unit/department that authorized medication assistance   Retain this document for future reference

## 2022-11-02 NOTE — CONSULTS
Brief Nutrition Note    Diabetic Education consult. Pt sleeping upon arrival and had received diabetic education earlier today. Left handouts in room.     Alesha Ortega     [phone]

## 2022-11-02 NOTE — PROGRESS NOTES
[facility]  Hyperglycemia Event      NAME: Saida Weaver  MEDICAL RECORD NUMBER:  [id number]  AGE: 57 y.o. GENDER: male  : 1960  EPISODE DATE:  2022     Data     Recent Labs     22  1039 22  1638 22  1640 22  2147 22  0209 22  0720   POCGLU 148* 324* 268* 312* 251* 295*       Medications  Scheduled Medications:   insulin glargine  5 Units SubCUTAneous QAM    insulin lispro  0-4 Units SubCUTAneous TID WC    insulin lispro  0-4 Units SubCUTAneous Nightly    sodium chloride flush  5-40 mL IntraVENous 2 times per day    enoxaparin  40 mg SubCUTAneous Daily       Diet  Current diet/supplement order: ADULT DIET;  Regular; 4 carb choices (60 gm/meal)     Recorded PO: PO Meals Eaten (%): 76 - 100% last meal in flowsheets      Action      Physician Notified of event: Yes   Dr. Zeynep Urrutia     Medication plan updated: Yes       Electronically signed by Cb Enriquez RN on 2022 at 8:30 AM

## 2022-11-02 NOTE — PROGRESS NOTES
Telephone report given at 22:09 to JIAN Salazar on 3w. Pt begin transported by wheelchair from 2115 to 3104. Pt has one phone Lizzie Ashby, cell phone, wallet, hat, two jackets, shoes, socks, and a pair of shorts being transported with him.

## 2022-11-02 NOTE — PROGRESS NOTES
Patient in bed, awake, a&ox4. Patient currently eating breakfast. Patient denies n/v at this time and states his appetite was not good the past couple days however, he is hungry this morning. Patient IV's flushed and locked. Patient able to ambulate around room independently. Patient voiding, no issues mentioned. Patient able to make needs known, no needs voiced at this time. Call light and bedside within reach. Will continue to monitor and reassess.

## 2022-11-02 NOTE — DISCHARGE INSTRUCTIONS
Help affording insulin is available through the following resources:  [facility] support: [facility]   Call for a certification appointment   [address]   [address], [address], [address]  Phone [phone], [phone] or Denilson Castro 96    [address], [facility]   Phone [phone] opt.5  Online support:  [email or web address]://getinsulin.org/

## 2022-11-02 NOTE — DISCHARGE SUMMARY
Hospital Medicine Discharge Summary    Patient ID: Macey Enriquez      Patient's PCP: No primary care provider on file. Admit Date: 10/31/2022     Discharge Date:   11/02/22      Admitting Provider: Pancho Chandler MD     Discharge Provider: Pancho Chandler MD     Discharge Diagnoses: Active Hospital Problems    Diagnosis     DKA, type 2, not at goal [facility]) [E11.10]      Priority: Medium       The patient was seen and examined on day of discharge and this discharge summary is in conjunction with any daily progress note from day of discharge. Hospital Course:     57 y.o. male who presents to [facility] with generalized weakness nausea and vomiting. Patient with no significant past medical history, not on any medications, stated that for the past 2 to 3 weeks he had been feeling progressively more weak, patient also noted excessive urination and thirst, in the last week patient also reported new onset nausea and vomiting. Patient presented with DKA. Newly diagnosed diabetes mellitus. DKA. Patient presented to emergency with progressive weakness as well as nausea vomiting, not known to be diabetic. On presentation patient was found to have glucose of 787, with an anion gap of 32, and bicarb of 15. Potassium 5.1     Admitted as a case of DKA, started on insulin as per protocol. Gap closed, glucose normalized patient was transitioned to subcut insulin  Hemoglobin A1c 16  Plan  -Will need insulin on discharge,galrgine 7 units. - meal time lispro 5 units TID. - metfromin. Acute kidney injury  Creatinine 1.7--> 0.9, likely prerenal.   Later normalized        Hypertension. Noted to be elevated on admission however is improving. Plan-we will continue to observe, may need medication on discharge.          Physical Exam Performed:     /84   Pulse 70   Temp 97.5 °F (36.4 °C) (Oral)   Resp 14   Ht 6' 1\" (1.854 m)   Wt 197 lb 8.5 oz (89.6 kg)   SpO2 98%   BMI 26.06 kg/m²       General appearance:  No apparent distress, appears stated age and cooperative. HEENT:  Normal cephalic, atraumatic without obvious deformity. Pupils equal, round, and reactive to light. Extra ocular muscles intact. Conjunctivae/corneas clear. Neck: Supple, with full range of motion. No jugular venous distention. Trachea midline. Respiratory:  Normal respiratory effort. Clear to auscultation, bilaterally without Rales/Wheezes/Rhonchi. Cardiovascular:  Regular rate and rhythm with normal S1/S2 without murmurs, rubs or gallops. Abdomen: Soft, non-tender, non-distended with normal bowel sounds. Musculoskeletal:  No clubbing, cyanosis or edema bilaterally. Full range of motion without deformity. Skin: Skin color, texture, turgor normal.  No rashes or lesions. Neurologic:  Neurovascularly intact without any focal sensory/motor deficits. Cranial nerves: II-XII intact, grossly non-focal.  Psychiatric:  Alert and oriented, thought content appropriate, normal insight  Capillary Refill: Brisk,< 3 seconds   Peripheral Pulses: +2 palpable, equal bilaterally       Labs: For convenience and continuity at follow-up the following most recent labs are provided:      CBC:    Lab Results   Component Value Date/Time    WBC 7.1 11/02/2022 04:05 AM    HGB 14.0 11/02/2022 04:05 AM    HCT 41.9 11/02/2022 04:05 AM     11/02/2022 04:05 AM       Renal:    Lab Results   Component Value Date/Time     11/02/2022 04:05 AM    K 4.1 11/02/2022 04:05 AM     11/02/2022 04:05 AM    CO2 21 11/02/2022 04:05 AM    BUN 11 11/02/2022 04:05 AM    CREATININE 0.9 11/02/2022 04:05 AM    CALCIUM 8.4 11/02/2022 04:05 AM    PHOS 2.8 11/02/2022 04:05 AM         Significant Diagnostic Studies    Radiology:   XR CHEST (2 VW)   Final Result   No acute cardiopulmonary disease.                 Consults:     IP CONSULT TO HOSPITALIST  IP CONSULT TO DIABETES EDUCATOR  IP CONSULT TO DIETITIAN    Disposition:  home Condition at Discharge: Stable    Discharge Instructions/Follow-up:    - metformin 1 gram BID. - glargine 7 units and lispro 5 units TID . - follow up with PCP. Code Status:  Full Code     Activity: activity as tolerated    Diet: diabetic diet      Discharge Medications:     Current Discharge Medication List             Details   metFORMIN (GLUCOPHAGE) 500 MG tablet Take 2 tablets by mouth 2 times daily (with meals)  Qty: 60 tablet, Refills: 0      insulin glargine (LANTUS SOLOSTAR) 100 UNIT/ML injection pen Inject 5 Units into the skin nightly  Qty: 5 Adjustable Dose Pre-filled Pen Syringe, Refills: 3      insulin lispro, 1 Unit Dial, (HUMALOG KWIKPEN) 100 UNIT/ML SOPN Inject 5 Units into the skin 3 times daily (before meals)  Qty: 4.5 mL, Refills: 2      Accu-Chek FastClix Lancets MISC 1 each by Does not apply route 3 times daily (before meals) Pharmacy to make formulary changes as needed  Qty: 1 each, Refills: 3      Blood Glucose Monitoring Suppl (ACCU-CHEK GUIDE) w/Device KIT 1 each by Does not apply route 3 times daily (before meals) Pharmacy to make formulary changes as needed  Qty: 1 kit, Refills: 1      blood glucose monitor strips Test 3 times a day & as needed for symptoms of irregular blood glucose. Dispense sufficient amount for indicated testing frequency plus additional to accommodate PRN testing needs Pharmacy to make formulary changes as needed  Qty: 50 strip, Refills: 0      Insulin Pen Needle 32G X 4 MM MISC 1 each by Does not apply route daily  Qty: 100 each, Refills: 3             Time Spent on discharge is more than 30 minutes in the examination, evaluation, counseling and review of medications and discharge plan. Signed:    Sara Chris MD   11/2/2022      Thank you No primary care provider on file. for the opportunity to be involved in this patient's care. If you have any questions or concerns, please feel free to contact me at [phone].

## 2022-11-02 NOTE — CARE COORDINATION
Noted d/c order. Patient will be returning to home where he lives with 2 granddaughters ages 15 and 15 that he cares for. Discussed with Diabetic Educator who has made referrals to [facility] and [address] [place] [address]. Patient unable to afford the $304 for his d/c meds so agreed to charily them this admit. He has info for follow up with [facility]. Also provided info on [employer].    Discussed with retail pharmacy staff. Encouraged patient to follow up with application for financial assist with hospital bill. No other needs identified at this time.    Electronically signed by Marcelo Donohue on 11/2/2022 at 2:03 PM

## 2022-11-02 NOTE — PLAN OF CARE
Problem: Discharge Planning  Goal: Discharge to home or other facility with appropriate resources  11/2/2022 0734 by Kenny Berkowitz RN  Outcome: Progressing  Flowsheets (Taken 11/1/2022 2145 by Juan Waldrop RN)  Discharge to home or other facility with appropriate resources:   Identify barriers to discharge with patient and caregiver   Arrange for needed discharge resources and transportation as appropriate   Identify discharge learning needs (meds, wound care, etc)   Refer to discharge planning if patient needs post-hospital services based on physician order or complex needs related to functional status, cognitive ability or social support system  11/2/2022 0734 by Kenny Berkowitz RN  Outcome: Yenifer Abarca (Taken 11/1/2022 2145 by Juan Waldrop RN)  Discharge to home or other facility with appropriate resources:   Identify barriers to discharge with patient and caregiver   Arrange for needed discharge resources and transportation as appropriate   Identify discharge learning needs (meds, wound care, etc)   Refer to discharge planning if patient needs post-hospital services based on physician order or complex needs related to functional status, cognitive ability or social support system  11/1/2022 2207 by Juan Waldrop RN  Outcome: Progressing  Flowsheets (Taken 11/1/2022 2145)  Discharge to home or other facility with appropriate resources:   Identify barriers to discharge with patient and caregiver   Arrange for needed discharge resources and transportation as appropriate   Identify discharge learning needs (meds, wound care, etc)   Refer to discharge planning if patient needs post-hospital services based on physician order or complex needs related to functional status, cognitive ability or social support system     Problem: Safety - Adult  Goal: Free from fall injury  11/2/2022 0734 by Kenny Berkowitz RN  Outcome: Yenifer Abarca (Taken 11/1/2022 2145 by Juan Waldrop RN)  Free From Fall Injury: Instruct family/caregiver on patient safety  11/2/2022 0734 by Jessica Castro RN  Outcome: Odalis Labor (Taken 11/1/2022 2145 by Sofia Alcazar RN)  Free From Fall Injury: Instruct family/caregiver on patient safety  11/1/2022 2207 by Sofia Alcazar RN  Outcome: Odalis Labor (Taken 11/1/2022 2145)  Free From Fall Injury: Instruct family/caregiver on patient safety

## 2022-11-02 NOTE — PROGRESS NOTES
Patient alert and oriented x4, discharged to home with documented belongings. IV's removed with no complications. Walked to front entrance by Jenifer Lewis, spouse transporting home. Reviewed discharge, follow up, and medication instructions with patient. Prescriptions handed to patient from retail pharmacy tech. No questions or concerns at this time.

## 2022-11-02 NOTE — PLAN OF CARE
Problem: Discharge Planning  Goal: Discharge to home or other facility with appropriate resources  11/1/2022 2207 by Santiago Martinez RN  Outcome: Progressing  Flowsheets (Taken 11/1/2022 2145)  Discharge to home or other facility with appropriate resources:   Identify barriers to discharge with patient and caregiver   Arrange for needed discharge resources and transportation as appropriate   Identify discharge learning needs (meds, wound care, etc)   Refer to discharge planning if patient needs post-hospital services based on physician order or complex needs related to functional status, cognitive ability or social support system  11/1/2022 1000 by Fei Ortega RN  Outcome: Progressing     Problem: Safety - Adult  Goal: Free from fall injury  11/1/2022 2207 by Santiago Martinez RN  Outcome: Juancarlos Ritter (Taken 11/1/2022 2145)  Free From Fall Injury: Instruct family/caregiver on patient safety  11/1/2022 1000 by Fei Ortega RN  Outcome: Progressing

## 2022-11-02 NOTE — PROGRESS NOTES
CLINICAL PHARMACY NOTE: MEDS TO BEDS    Total # of Prescriptions Filled: 7   The following medications were delivered to the patient:  Current Discharge Medication List        START taking these medications    Details   metFORMIN (GLUCOPHAGE) 500 MG tablet Take 2 tablets by mouth 2 times daily (with meals)  Qty: 60 tablet, Refills: 0      insulin glargine (LANTUS SOLOSTAR) 100 UNIT/ML injection pen Inject 5 Units into the skin nightly  Qty: 5 Adjustable Dose Pre-filled Pen Syringe, Refills: 3      insulin lispro, 1 Unit Dial, (HUMALOG KWIKPEN) 100 UNIT/ML SOPN Inject 5 Units into the skin 3 times daily (before meals)  Qty: 4.5 mL, Refills: 2      Accu-Chek FastClix Lancets MISC 1 each by Does not apply route 3 times daily (before meals) Pharmacy to make formulary changes as needed  Qty: 1 each, Refills: 3      Blood Glucose Monitoring Suppl (ACCU-CHEK GUIDE) w/Device KIT 1 each by Does not apply route 3 times daily (before meals) Pharmacy to make formulary changes as needed  Qty: 1 kit, Refills: 1      blood glucose monitor strips Test 3 times a day & as needed for symptoms of irregular blood glucose.  Dispense sufficient amount for indicated testing frequency plus additional to accommodate PRN testing needs Pharmacy to make formulary changes as needed  Qty: 50 strip, Refills: 0      Insulin Pen Needle 32G X 4 MM MISC 1 each by Does not apply route daily  Qty: 100 each, Refills: 3               Additional Documentation:

## 2022-11-02 NOTE — PLAN OF CARE
Problem: Discharge Planning  Goal: Discharge to home or other facility with appropriate resources  11/2/2022 0734 by Clemente Mirza RN  Outcome: Progressing  Flowsheets (Taken 11/1/2022 2145 by Joan Diana, RN)  Discharge to home or other facility with appropriate resources:   Identify barriers to discharge with patient and caregiver   Arrange for needed discharge resources and transportation as appropriate   Identify discharge learning needs (meds, wound care, etc)   Refer to discharge planning if patient needs post-hospital services based on physician order or complex needs related to functional status, cognitive ability or social support system  11/1/2022 2207 by Joan Diana, RN  Outcome: Progressing  Flowsheets (Taken 11/1/2022 2145)  Discharge to home or other facility with appropriate resources:   Identify barriers to discharge with patient and caregiver   Arrange for needed discharge resources and transportation as appropriate   Identify discharge learning needs (meds, wound care, etc)   Refer to discharge planning if patient needs post-hospital services based on physician order or complex needs related to functional status, cognitive ability or social support system     Problem: Safety - Adult  Goal: Free from fall injury  11/2/2022 0734 by Clemente Mirza RN  Outcome: Alvarez Bras (Taken 11/1/2022 2145 by Joan Diana, RN)  Free From Fall Injury: Instruct family/caregiver on patient safety  11/1/2022 2207 by Joan Diana, RN  Outcome: Progressing  Flowsheets (Taken 11/1/2022 2145)  Free From Fall Injury: Instruct family/caregiver on patient safety

## 2022-11-02 NOTE — PROGRESS NOTES
Patient noon blood glucose 394, MD notified and advised to administer 8 units total of Humalog. Patient tolerated well. No questions or concerns at this time. Will continue to monitor and reassess.   Electronically signed by River Medrano RN on 11/2/2022 at 12:21 PM

## 2022-11-02 NOTE — PROGRESS NOTES
[facility]  Diabetes Education   Progress Note       NAME:  Briana Amador  MEDICAL RECORD NUMBER:  [id number]  AGE: 57 y.o. GENDER: male  : 1960  TODAY'S DATE:  2022    Subjective   Reason for Diabetic Education Evaluation and Assessment: new onset diabetes    Nick Carias is hoping for discharge today. Visit Type: follow-up      Briana Amador is a 57 y.o. male referred by:     [x] Physician  [] Nursing  [] Chart Review   [] Other:     PAST MEDICAL HISTORY        Diagnosis Date    Hypertension        PAST SURGICAL HISTORY    History reviewed. No pertinent surgical history. FAMILY HISTORY    History reviewed. No pertinent family history. SOCIAL HISTORY    Social History     Tobacco Use    Smoking status: Every Day     Types: Cigarettes    Smokeless tobacco: Never   Substance Use Topics    Alcohol use: Not Currently    Drug use: Never       ALLERGIES    No Known Allergies    MEDICATIONS     insulin glargine  7 Units SubCUTAneous QAM    insulin lispro  0-4 Units SubCUTAneous TID WC    insulin lispro  0-4 Units SubCUTAneous Nightly    sodium chloride flush  5-40 mL IntraVENous 2 times per day    enoxaparin  40 mg SubCUTAneous Daily       Objective        Patient Active Problem List   Diagnosis Code    DKA, type 2, not at goal ([facility] [address] E11.10        /84   Pulse 70   Temp 97.5 °F (36.4 °C) (Oral)   Resp 14   Ht 6' 1\" (1.854 m)   Wt 197 lb 8.5 oz (89.6 kg)   SpO2 98%   BMI 26.06 kg/m²     HgBA1c:    Lab Results   Component Value Date/Time    LABA1C 17.2 2022 08:44 AM       Recent Labs     22  1640 22  2147 22  0209 22  0720   POCGLU 268* 312* 251* 295*       BUN/Creatinine:    Lab Results   Component Value Date/Time    BUN 11 2022 04:05 AM    CREATININE 0.9 2022 04:05 AM       Assessment        Diabetes Management and Education    Does the patient have a Primary Care Physician? No     Does the patient require new medication instruction? Yes,   Reinforced basal and prandial insulin concepts. Person responsible for administration of Insulin/Medication:        [x] Self     [] Caregiver       [] Spouse       [] Other Family Member   []  Other      Level of patient/caregiver understanding able to:       [x] Verbalized Understanding   [] Demonstrated Understanding       [x] Teach Back       [] Needs Reinforcement     []  Other:        Does the patient/caregiver monitor Blood Glucoses? No:   Reviewed glycemic control targets, testing frequency and when to call PCP. Level of patient/caregiver understanding able to:        [x] Verbalized Understanding   [] Demonstrated Understanding       [] Teach Back       [] Needs Reinforcement     []  Other:        Does the patient/caregiver follow a Meal Plan? No:    Reviewed importance of eating three meals per day and plate method for consistent carb intake. Level of patient/caregiver understanding able to:       [x] Verbalized Understanding   [] Demonstrated Understanding       [] Teach Back       [] Needs Reinforcement     []  Other:      Does the patient/caregiver understand S/S of Hypoglycemia? No:   Reviewed symptoms, prevention and treatment. Level of patient/caregiver understanding able to:       [x] Verbalized Understanding   [] Demonstrated Understanding       [] Teach Back       [] Needs Reinforcement     []  Other:                    Does the patient/caregiver understand S/S of Hyperglycemia? No:     Reviewed symptoms, prevention and treatment. Level of patient/caregiver understanding able to:        [x] Verbalized Understanding   [] Demonstrated Understanding       [] Teach Back       [] Needs Reinforcement     []  Other:           Plan        Ongoing diabetes education and blood glucose monitoring. Recommend meds to beds. Referral to [address] [place] [address] faxed yesterday. [facility].                                            Discharge Plan:  Discharge needs: insulin pens and 4mm pen needles and glucometer/strips/lancets  Placement for patient upon discharge: independent living        Teaching Time Diabetes Education:  20 minutes     Electronically signed by Sahara Arellano on 11/2/2022 at 10:56 AM

## 2022-11-08 ENCOUNTER — TELEPHONE (OUTPATIENT)
Dept: PHARMACY | Age: 62
End: 2022-11-08

## 2022-11-08 NOTE — TELEPHONE ENCOUNTER
New Diabetes referral from the hospitalist.  Recommended by the Diabetes Educator. Reached out to schedule initial appointment. Left message to please return my call    Yeyo Anna, PharmD  [facility]  Diabetes Service  [phone]    For Pharmacy [facility] in place:  Yes  Recommendation Provided To:    Intervention Detail:   Gap Closed?:    Intervention Accepted By:   Time Spent (min): 5

## 2022-11-16 ENCOUNTER — TELEPHONE (OUTPATIENT)
Dept: PHARMACY | Age: 62
End: 2022-11-16

## 2022-11-16 NOTE — TELEPHONE ENCOUNTER
New Diabetes referral from the hospitalist.  Recommended by the Diabetes Educator. Reached out to schedule initial appointment. Left message to please return my call    New [place], PharmD  [facility]  Diabetes Service  [phone]    For Pharmacy [facility] in place:  Yes  Recommendation Provided To:    Intervention Detail:   Gap Closed?:    Intervention Accepted By:   Time Spent (min): 5

## 2022-11-22 ENCOUNTER — TELEPHONE (OUTPATIENT)
Dept: PHARMACY | Age: 62
End: 2022-11-22

## 2022-11-22 NOTE — TELEPHONE ENCOUNTER
New Diabetes referral from the hospitalist.  Recommended by the Diabetes Educator. Reached out to schedule initial appointment. Left message to please return my call    3rd attempt. Will close referral.  Welcome at any time. Desire Eubanks, PharmD  [address] [place] [address]  Diabetes Service  [phone]    For Pharmacy Admin Tracking Only    CPA in place:  Yes  Recommendation Provided To:    Intervention Detail:   Gap Closed?:    Intervention Accepted By:   Time Spent (min): 5

## 2022-11-29 ENCOUNTER — TELEPHONE (OUTPATIENT)
Dept: PHARMACY | Age: 62
End: 2022-11-29

## 2022-11-29 NOTE — TELEPHONE ENCOUNTER
Jeri Cobos returned my call and schedule Diabetes visit for 12/12/22. Coy Arellano, PharmD  [facility]  Diabetes Service  [phone]    For Pharmacy Admin Tracking Only    CPA in place:  Yes  Recommendation Provided To:    Intervention Detail:   Gap Closed?:    Intervention Accepted By:   Time Spent (min): 10

## 2022-12-12 ENCOUNTER — OFFICE VISIT (OUTPATIENT)
Dept: PHARMACY | Age: 62
End: 2022-12-12

## 2022-12-12 DIAGNOSIS — E11.10 DKA, TYPE 2, NOT AT GOAL (HCC): Primary | ICD-10-CM

## 2022-12-12 PROCEDURE — 99213 OFFICE O/P EST LOW 20 MIN: CPT

## 2022-12-12 NOTE — PROGRESS NOTES
[facility]  Pharmacy  Diabetes Service    [address]  Phone: [phone]  Fax: [phone]    NAME: Rafaela Pike  MEDICAL RECORD NUMBER:  [id number]  AGE: 57 y.o. GENDER: male  : 1960  EPISODE DATE:  2022       Rafaela Pike was referred to the [facility] by Dr. Shawn Posey for Diabetes services. Special Instructions per the Referral Include: Patient Education and Medication Management    1. DKA, type 2, not at goal [facility])        Referral goals: No goals were included on the referral     If goals are not included on the referral, ADA guidelines will be used. This visit was performed as:  THIS VISIT WAS PERFORMED AS: An in person visit. Protocols were followed with precautions to reduce the spread of COVID-19. Louie Later is a 57 y.o. here for the Diabetes Service for self-management education, medication review including over the counter medications and herbal products, overall wellbeing assessment, transition of care and any needed adjustments with updates and recommendations communicated to the referring physician. Patient findings:    Hyperglycemia Symptoms:    Polyuria - \"slowed\"  Fatigue - \"a little\"  Neuropathy - tingling in feet; none in hands    Denies polydipsia  Denies polyphagia  Denies vision changes    Hypoglycemia Symptoms: none    Other general findings: none    Recent hospitalization:    Hospitalized at [facility] from 10/31/22 to 2022  Chief Complaint: general weakness; nausea and vomiting; polydipsia and polyuria  Hospital Brief Assessment: Diabetes Type 2  DKA: yes     Comments:   - Reported date of diabetes diagnosis: 10/31/22, symptoms the summer prior.   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     Past Medical History:   Diagnosis Date Hypertension        HPI:   1. DKA, type 2, not at goal [facility])        Social History     Tobacco Use    Smoking status: Every Day     Types: Cigarettes    Smokeless tobacco: Never   Substance Use Topics    Alcohol use: Not Currently       Pertinent Labs:    Lab Results   Component Value Date    LABA1C 17.2 2022    LABA1C 16.5 10/31/2022       No results found for: DAO  No results found for: ISLET  No results found for: CPEPTIDE    Lab Results   Component Value Date    CREATININE 0.9 2022    BUN 11 2022     2022    K 4.1 2022     2022    CO2 21 2022       No results found for: MALBCR    No results found for: CHOL, TRIG, HDL, LDLCALC  No results found for: LDLDIRECT    Lab Results   Component Value Date/Time    ALT 15 10/31/2022 12:29 PM       Weight:  Wt Readings from Last 3 Encounters:   22 197 lb 8.5 oz (89.6 kg)       Immunizations: There is no immunization history on file for this patient.     Blood Glucose Assessment     Home Blood Glucose Results and Insulin dosin- ~300    Thorough Medication Assessment     Current medications:  Current Outpatient Medications   Medication Sig Dispense Refill    insulin glargine (LANTUS SOLOSTAR) 100 UNIT/ML injection pen Inject 5 Units into the skin nightly 5 Adjustable Dose Pre-filled Pen Syringe 3    insulin lispro, 1 Unit Dial, (HUMALOG KWIKPEN) 100 UNIT/ML SOPN Inject 5 Units into the skin 3 times daily (before meals) 4.5 mL 2    metFORMIN (GLUCOPHAGE) 500 MG tablet Take 2 tablets by mouth 2 times daily (with meals) 60 tablet 0    Accu-Chek FastClix Lancets MISC 1 each by Does not apply route 3 times daily (before meals) Pharmacy to make formulary changes as needed 1 each 3    Blood Glucose Monitoring Suppl (ACCU-CHEK GUIDE) w/Device KIT 1 each by Does not apply route 3 times daily (before meals) Pharmacy to make formulary changes as needed 1 kit 1    blood glucose monitor strips Test 3 times a day & as needed for symptoms of irregular blood glucose. Dispense sufficient amount for indicated testing frequency plus additional to accommodate PRN testing needs Pharmacy to make formulary changes as needed 50 strip 0    Insulin Pen Needle 32G X 4 MM MISC 1 each by Does not apply route daily 100 each 3     No current facility-administered medications for this visit. Reviewed and updated Diabetes medications only at today's visit. No adverse reactions reported  No missed doses were noted    Provided counseling with regards to Diabetes medications    Device Education:   Provided education with regards to diabetic devices      Further Assessment / Education     General visit information:   Kelsea Chatman appears well and in no acute distress. Comes with no assistance. Is here today alone for diabetes management. Robe Birch appears ready, willing and able to engage in self-management activities.      Robe Birch     Patient's current eating patterns:   - assessed briefly  - Beverages - No pop; sweet tea; coffee w sugar packet     Physical current activity:   - thinking about joining the gym    Weight management plan:   - no plan    General Diabetes Education:   Difference between Type 1, Prediabetes and Type 2 and the progressive nature of Type 2  Diagnosis criteria  Symptoms  Complications including Nephropathy, Neuropathy, Retinopathy, Heart Disease and Foot complications  M6X target  Weight loss  Physical Activity with a goal of 30 minutes 5 times a week  Healthy eating patterns, My Plate Method of Eating, and Carbohydrate Counting  Blood Glucose and Meal Log  Management of low and high blood glucose readings  Blood pressure control with a goal of < 130/80  Cholesterol control and possible statin medication  Smoking cessation  Annual Microalbumin / Creatinine Ratio, annual eye exam, and comprehensive annual foot exam and proper self foot care  Vaccinations    Diabetes booklet given: yes      - Current Smoking Assessment: Continues to smoke  - Willingness to Quit:    Willingness to Quit: Will choose a quit date shortly  - Smoking Cessation Assistance:    No smoking cessation aides at this time   - reports he quit and then started again. Plans to quit again    Physician Follow-up for Diabetes: No      Further Assessment / Plan     1. DKA, type 2, not at goal [facility])        Diabetes Medication Changes and or Recommendations Made Today:   - No medication changes  - Find alternative to sweet tea  - continue to work to find a PCP  - look into whether you would qualify for Medicaid  - walked him to registration to get a financial assistance form    Recommendations to the physician:  - [facility] Follow-up   Diabetes Service   As needed  Will f/u by phone to check status and need for assistance.     Electronically signed by Cathleen Guerra PharmD on 2022 at 9:43 AM      CLINICAL PHARMACY CONSULT: MED RECONCILIATION/REVIEW ADDENDUM    For Pharmacy Admin Tracking Only    Program: Medication Management  CPA in place:  Yes  Recommendation Provided To: Patient/Caregiver: 4 via In person  Intervention Detail: Adherence Monitorin  Gap Closed?: No   Intervention Accepted By: Patient/Caregiver: 4  Time Spent (min):  75

## 2022-12-12 NOTE — PATIENT INSTRUCTIONS
I would stop at the front registration desk and  a financial assistance form to fill out to help with the cost of your hospital stay. I would reach out to see if you qualify for Medicaid. [place] [phone]. They may be able to help with your hospital bill. Billing number at [facility] is [phone] to help with your hospital bill. I would reach out to [address]. They will help with free medication and free health care to those who qualify. Their phone number is [phone] ([facility]) and [phone] [facility])  Think about finding an alternative to sweet tea. Crystal Light is a great option. Diabetes general instructions:    Be sure to take your medications as instructed. Avoid missing doses. If you are taking insulin, follow proper insulin injection techniques that include but are not limited to:   Rotating injection sites   Priming your insulin pens. Following the directions provided    Work to lose weight if you are overweight. This will help improve blood sugar control. Work to improve or maintain Physical Activity:    - goal of 30 minutes 5 days a week. Keep good blood pressure control:   - goal < 130/80    Keep good cholesterol control    Work to quit smoking or remain smoke free    Your doctor will check a urine test each year called a Microalbumin / Creatinine Urine Ratio. This looks at how Diabetes may be affecting your kidney function. You should get an eye exam every year    Your doctor will perform a comprehensive foot exam every year. Follow the foot care recommendations provided in our Diabetes booklet. Call your physician right away if you have any problems with your feet. Remain up to date with your vaccines. Get your flu vaccine every year during flu season usual starting in October. Other vaccines include the pneumonia vaccine and COVID-19 vaccine as well as others.     Follow recommended eating and drinking patterns that may include the following:  Working to eliminate or decrease sugary beverages  Mediterranean eating pattern  Follow recommended carbohydrate eating pattern  My plate method  Carbohydrate counting     Please seek medical advice if you have blood sugars that run in the 300's or above. If you have symptoms of low blood sugar such as feeling shaky, lightheaded, dizzy, sleepy, or confused then test your sugar. If you have low blood sugar of less than 70mg/dL, then eat or drink something with 15 grams of carbohydrates like a half cup of juice (4 oz) or 3-4 glucose tablets. Recheck your sugar in 15 minutes. If your blood glucose is less than 70mg/dL again, have another 15 grams of carbohydrates. Continue until above 70mg/dL. Call your physician if you experience low blood sugars. Please call any time with questions or concerns at [phone].